# Patient Record
Sex: FEMALE | Race: WHITE | NOT HISPANIC OR LATINO | Employment: OTHER | URBAN - NONMETROPOLITAN AREA
[De-identification: names, ages, dates, MRNs, and addresses within clinical notes are randomized per-mention and may not be internally consistent; named-entity substitution may affect disease eponyms.]

---

## 2017-09-02 ENCOUNTER — HOSPITAL ENCOUNTER (EMERGENCY)
Facility: HOSPITAL | Age: 67
Discharge: HOME | End: 2017-09-02
Attending: FAMILY MEDICINE | Admitting: FAMILY MEDICINE

## 2017-09-02 ENCOUNTER — APPOINTMENT (OUTPATIENT)
Dept: CT IMAGING | Facility: HOSPITAL | Age: 67
End: 2017-09-02

## 2017-09-02 VITALS
BODY MASS INDEX: 41.65 KG/M2 | SYSTOLIC BLOOD PRESSURE: 148 MMHG | WEIGHT: 250 LBS | TEMPERATURE: 98.6 F | RESPIRATION RATE: 18 BRPM | HEART RATE: 52 BPM | DIASTOLIC BLOOD PRESSURE: 86 MMHG | HEIGHT: 65 IN | OXYGEN SATURATION: 98 %

## 2017-09-02 DIAGNOSIS — K44.9 HIATAL HERNIA WITH GERD: Primary | ICD-10-CM

## 2017-09-02 DIAGNOSIS — K21.9 HIATAL HERNIA WITH GERD: Primary | ICD-10-CM

## 2017-09-02 LAB
ALBUMIN SERPL-MCNC: 4.6 G/DL (ref 3.4–4.8)
ALBUMIN/GLOB SERPL: 1.8 G/DL (ref 1.5–2.5)
ALP SERPL-CCNC: 53 U/L (ref 35–104)
ALT SERPL W P-5'-P-CCNC: 54 U/L (ref 10–36)
ANION GAP SERPL CALCULATED.3IONS-SCNC: 6.4 MMOL/L (ref 3.6–11.2)
APTT PPP: 23.4 SECONDS (ref 23.8–36.1)
AST SERPL-CCNC: 35 U/L (ref 10–30)
BASOPHILS # BLD AUTO: 0.03 10*3/MM3 (ref 0–0.3)
BASOPHILS NFR BLD AUTO: 0.5 % (ref 0–2)
BILIRUB SERPL-MCNC: 0.5 MG/DL (ref 0.2–1.8)
BUN BLD-MCNC: 10 MG/DL (ref 7–21)
BUN/CREAT SERPL: 10.9 (ref 7–25)
CALCIUM SPEC-SCNC: 10.1 MG/DL (ref 7.7–10)
CHLORIDE SERPL-SCNC: 104 MMOL/L (ref 99–112)
CO2 SERPL-SCNC: 27.6 MMOL/L (ref 24.3–31.9)
CREAT BLD-MCNC: 0.92 MG/DL (ref 0.43–1.29)
DEPRECATED RDW RBC AUTO: 50.5 FL (ref 37–54)
EOSINOPHIL # BLD AUTO: 0.04 10*3/MM3 (ref 0–0.7)
EOSINOPHIL NFR BLD AUTO: 0.6 % (ref 0–5)
ERYTHROCYTE [DISTWIDTH] IN BLOOD BY AUTOMATED COUNT: 15 % (ref 11.5–14.5)
GFR SERPL CREATININE-BSD FRML MDRD: 61 ML/MIN/1.73
GLOBULIN UR ELPH-MCNC: 2.6 GM/DL
GLUCOSE BLD-MCNC: 182 MG/DL (ref 70–110)
HCT VFR BLD AUTO: 46.7 % (ref 37–47)
HGB BLD-MCNC: 15.3 G/DL (ref 12–16)
IMM GRANULOCYTES # BLD: 0 10*3/MM3 (ref 0–0.03)
IMM GRANULOCYTES NFR BLD: 0 % (ref 0–0.5)
INR PPP: 0.91 (ref 0.9–1.1)
LYMPHOCYTES # BLD AUTO: 0.9 10*3/MM3 (ref 1–3)
LYMPHOCYTES NFR BLD AUTO: 14.2 % (ref 21–51)
MCH RBC QN AUTO: 31.5 PG (ref 27–33)
MCHC RBC AUTO-ENTMCNC: 32.8 G/DL (ref 33–37)
MCV RBC AUTO: 96.1 FL (ref 80–94)
MONOCYTES # BLD AUTO: 0.22 10*3/MM3 (ref 0.1–0.9)
MONOCYTES NFR BLD AUTO: 3.5 % (ref 0–10)
NEUTROPHILS # BLD AUTO: 5.16 10*3/MM3 (ref 1.4–6.5)
NEUTROPHILS NFR BLD AUTO: 81.2 % (ref 30–70)
OSMOLALITY SERPL CALC.SUM OF ELEC: 279.4 MOSM/KG (ref 273–305)
PLATELET # BLD AUTO: 235 10*3/MM3 (ref 130–400)
PMV BLD AUTO: 9.6 FL (ref 6–10)
POTASSIUM BLD-SCNC: 4.2 MMOL/L (ref 3.5–5.3)
PROT SERPL-MCNC: 7.2 G/DL (ref 6–8)
PROTHROMBIN TIME: 12.3 SECONDS (ref 11–15.4)
RBC # BLD AUTO: 4.86 10*6/MM3 (ref 4.2–5.4)
SODIUM BLD-SCNC: 138 MMOL/L (ref 135–153)
WBC NRBC COR # BLD: 6.35 10*3/MM3 (ref 4.5–12.5)

## 2017-09-02 PROCEDURE — 74177 CT ABD & PELVIS W/CONTRAST: CPT | Performed by: RADIOLOGY

## 2017-09-02 PROCEDURE — 85730 THROMBOPLASTIN TIME PARTIAL: CPT | Performed by: FAMILY MEDICINE

## 2017-09-02 PROCEDURE — 74177 CT ABD & PELVIS W/CONTRAST: CPT

## 2017-09-02 PROCEDURE — 99283 EMERGENCY DEPT VISIT LOW MDM: CPT

## 2017-09-02 PROCEDURE — 96374 THER/PROPH/DIAG INJ IV PUSH: CPT

## 2017-09-02 PROCEDURE — 85610 PROTHROMBIN TIME: CPT | Performed by: FAMILY MEDICINE

## 2017-09-02 PROCEDURE — 0 IOPAMIDOL 61 % SOLUTION: Performed by: FAMILY MEDICINE

## 2017-09-02 PROCEDURE — 85025 COMPLETE CBC W/AUTO DIFF WBC: CPT | Performed by: FAMILY MEDICINE

## 2017-09-02 PROCEDURE — 80053 COMPREHEN METABOLIC PANEL: CPT | Performed by: FAMILY MEDICINE

## 2017-09-02 PROCEDURE — 96361 HYDRATE IV INFUSION ADD-ON: CPT

## 2017-09-02 RX ORDER — ESOMEPRAZOLE MAGNESIUM 40 MG/1
40 CAPSULE, DELAYED RELEASE ORAL
Qty: 30 CAPSULE | Refills: 0 | Status: SHIPPED | OUTPATIENT
Start: 2017-09-02 | End: 2022-04-13

## 2017-09-02 RX ORDER — ALUMINA, MAGNESIA, AND SIMETHICONE 2400; 2400; 240 MG/30ML; MG/30ML; MG/30ML
15 SUSPENSION ORAL ONCE
Status: COMPLETED | OUTPATIENT
Start: 2017-09-02 | End: 2017-09-02

## 2017-09-02 RX ORDER — ALBUTEROL SULFATE 90 UG/1
2 AEROSOL, METERED RESPIRATORY (INHALATION) EVERY 4 HOURS PRN
COMMUNITY
End: 2022-02-15 | Stop reason: SDUPTHER

## 2017-09-02 RX ORDER — FAMOTIDINE 10 MG/ML
20 INJECTION, SOLUTION INTRAVENOUS ONCE
Status: COMPLETED | OUTPATIENT
Start: 2017-09-02 | End: 2017-09-02

## 2017-09-02 RX ORDER — LANOLIN ALCOHOL/MO/W.PET/CERES
400 CREAM (GRAM) TOPICAL DAILY
Status: ON HOLD | COMMUNITY
End: 2022-07-20

## 2017-09-02 RX ORDER — METOPROLOL SUCCINATE 25 MG/1
25 TABLET, EXTENDED RELEASE ORAL DAILY
Status: ON HOLD | COMMUNITY
End: 2022-07-20

## 2017-09-02 RX ORDER — PREDNISONE 1 MG/1
10 TABLET ORAL
COMMUNITY
End: 2018-01-21

## 2017-09-02 RX ORDER — ASPIRIN 81 MG/1
81 TABLET, CHEWABLE ORAL DAILY
COMMUNITY

## 2017-09-02 RX ORDER — PREGABALIN 100 MG/1
300 CAPSULE ORAL 2 TIMES DAILY
COMMUNITY

## 2017-09-02 RX ORDER — ESCITALOPRAM OXALATE 20 MG/1
30 TABLET ORAL DAILY
COMMUNITY

## 2017-09-02 RX ORDER — SODIUM CHLORIDE 0.9 % (FLUSH) 0.9 %
10 SYRINGE (ML) INJECTION AS NEEDED
Status: DISCONTINUED | OUTPATIENT
Start: 2017-09-02 | End: 2017-09-03 | Stop reason: HOSPADM

## 2017-09-02 RX ORDER — ACETAMINOPHEN AND CODEINE PHOSPHATE 300; 30 MG/1; MG/1
1 TABLET ORAL 3 TIMES DAILY
COMMUNITY
End: 2019-03-27

## 2017-09-02 RX ORDER — SUCRALFATE 1 G/1
1 TABLET ORAL 4 TIMES DAILY
COMMUNITY
End: 2022-04-13

## 2017-09-02 RX ADMIN — LIDOCAINE HYDROCHLORIDE 15 ML: 20 SOLUTION ORAL; TOPICAL at 20:06

## 2017-09-02 RX ADMIN — IOPAMIDOL 90 ML: 612 INJECTION, SOLUTION INTRAVENOUS at 21:00

## 2017-09-02 RX ADMIN — FAMOTIDINE 20 MG: 10 INJECTION INTRAVENOUS at 20:06

## 2017-09-02 RX ADMIN — ALUMINUM HYDROXIDE, MAGNESIUM HYDROXIDE, AND DIMETHICONE 15 ML: 400; 400; 40 SUSPENSION ORAL at 20:05

## 2017-09-02 RX ADMIN — SODIUM CHLORIDE 500 ML: 9 INJECTION, SOLUTION INTRAVENOUS at 20:13

## 2017-09-03 NOTE — ED NOTES
Patient signed informed consent for IV contrast.     Aracelis Strong RN  09/02/17 2024       Aracelis Strong RN  09/02/17 2025

## 2017-09-03 NOTE — ED NOTES
Patient states that she has been having nausea and vomiting for about 5 days. Patient states that she knows she has a hernia but she was told before that surgery was not an option at that time. Patient states that she can only eat small amounts of food. If she attempts to eat normal amounts of food then she vomits.     Aracelis Strong RN  09/02/17 4899

## 2017-09-18 ENCOUNTER — APPOINTMENT (OUTPATIENT)
Dept: GENERAL RADIOLOGY | Facility: HOSPITAL | Age: 67
End: 2017-09-18

## 2017-09-18 ENCOUNTER — HOSPITAL ENCOUNTER (EMERGENCY)
Facility: HOSPITAL | Age: 67
Discharge: HOME | End: 2017-09-18
Attending: FAMILY MEDICINE | Admitting: FAMILY MEDICINE

## 2017-09-18 VITALS
HEIGHT: 66 IN | BODY MASS INDEX: 40.18 KG/M2 | SYSTOLIC BLOOD PRESSURE: 138 MMHG | HEART RATE: 63 BPM | DIASTOLIC BLOOD PRESSURE: 81 MMHG | TEMPERATURE: 97.7 F | WEIGHT: 250 LBS | OXYGEN SATURATION: 97 % | RESPIRATION RATE: 18 BRPM

## 2017-09-18 DIAGNOSIS — I10 ESSENTIAL HYPERTENSION: ICD-10-CM

## 2017-09-18 DIAGNOSIS — R07.9 CHEST PAIN IN ADULT: Primary | ICD-10-CM

## 2017-09-18 LAB
ALBUMIN SERPL-MCNC: 4.2 G/DL (ref 3.4–4.8)
ALBUMIN/GLOB SERPL: 1.8 G/DL (ref 1.5–2.5)
ALP SERPL-CCNC: 45 U/L (ref 35–104)
ALT SERPL W P-5'-P-CCNC: 42 U/L (ref 10–36)
ANION GAP SERPL CALCULATED.3IONS-SCNC: 6.5 MMOL/L (ref 3.6–11.2)
AST SERPL-CCNC: 32 U/L (ref 10–30)
BASOPHILS # BLD AUTO: 0.04 10*3/MM3 (ref 0–0.3)
BASOPHILS NFR BLD AUTO: 0.7 % (ref 0–2)
BILIRUB SERPL-MCNC: 0.5 MG/DL (ref 0.2–1.8)
BUN BLD-MCNC: 8 MG/DL (ref 7–21)
BUN/CREAT SERPL: 8.5 (ref 7–25)
CALCIUM SPEC-SCNC: 9.4 MG/DL (ref 7.7–10)
CHLORIDE SERPL-SCNC: 105 MMOL/L (ref 99–112)
CO2 SERPL-SCNC: 24.5 MMOL/L (ref 24.3–31.9)
CREAT BLD-MCNC: 0.94 MG/DL (ref 0.43–1.29)
DEPRECATED RDW RBC AUTO: 51.4 FL (ref 37–54)
EOSINOPHIL # BLD AUTO: 0.06 10*3/MM3 (ref 0–0.7)
EOSINOPHIL NFR BLD AUTO: 1 % (ref 0–7)
ERYTHROCYTE [DISTWIDTH] IN BLOOD BY AUTOMATED COUNT: 15.2 % (ref 11.5–14.5)
GFR SERPL CREATININE-BSD FRML MDRD: 60 ML/MIN/1.73
GLOBULIN UR ELPH-MCNC: 2.3 GM/DL
GLUCOSE BLD-MCNC: 162 MG/DL (ref 70–110)
HCT VFR BLD AUTO: 46.1 % (ref 37–47)
HGB BLD-MCNC: 15 G/DL (ref 12–16)
HOLD SPECIMEN: NORMAL
HOLD SPECIMEN: NORMAL
IMM GRANULOCYTES # BLD: 0.01 10*3/MM3 (ref 0–0.03)
IMM GRANULOCYTES NFR BLD: 0.2 % (ref 0–0.5)
LYMPHOCYTES # BLD AUTO: 0.75 10*3/MM3 (ref 1–3)
LYMPHOCYTES NFR BLD AUTO: 12.7 % (ref 16–46)
MCH RBC QN AUTO: 31.2 PG (ref 27–33)
MCHC RBC AUTO-ENTMCNC: 32.5 G/DL (ref 33–37)
MCV RBC AUTO: 95.8 FL (ref 80–94)
MONOCYTES # BLD AUTO: 0.28 10*3/MM3 (ref 0.1–0.9)
MONOCYTES NFR BLD AUTO: 4.7 % (ref 0–12)
NEUTROPHILS # BLD AUTO: 4.77 10*3/MM3 (ref 1.4–6.5)
NEUTROPHILS NFR BLD AUTO: 80.7 % (ref 40–75)
OSMOLALITY SERPL CALC.SUM OF ELEC: 273.8 MOSM/KG (ref 273–305)
PLATELET # BLD AUTO: 216 10*3/MM3 (ref 130–400)
PMV BLD AUTO: 9.6 FL (ref 6–10)
POTASSIUM BLD-SCNC: 4.2 MMOL/L (ref 3.5–5.3)
PROT SERPL-MCNC: 6.5 G/DL (ref 6–8)
RBC # BLD AUTO: 4.81 10*6/MM3 (ref 4.2–5.4)
SODIUM BLD-SCNC: 136 MMOL/L (ref 135–153)
TROPONIN I SERPL-MCNC: <0.006 NG/ML
TROPONIN I SERPL-MCNC: <0.006 NG/ML
WBC NRBC COR # BLD: 5.91 10*3/MM3 (ref 4.5–12.5)
WHOLE BLOOD HOLD SPECIMEN: NORMAL
WHOLE BLOOD HOLD SPECIMEN: NORMAL

## 2017-09-18 PROCEDURE — 93005 ELECTROCARDIOGRAM TRACING: CPT | Performed by: EMERGENCY MEDICINE

## 2017-09-18 PROCEDURE — 71020 HC CHEST PA AND LATERAL: CPT

## 2017-09-18 PROCEDURE — 84484 ASSAY OF TROPONIN QUANT: CPT | Performed by: EMERGENCY MEDICINE

## 2017-09-18 PROCEDURE — 93010 ELECTROCARDIOGRAM REPORT: CPT | Performed by: INTERNAL MEDICINE

## 2017-09-18 PROCEDURE — 85025 COMPLETE CBC W/AUTO DIFF WBC: CPT | Performed by: EMERGENCY MEDICINE

## 2017-09-18 PROCEDURE — 84484 ASSAY OF TROPONIN QUANT: CPT | Performed by: FAMILY MEDICINE

## 2017-09-18 PROCEDURE — 80053 COMPREHEN METABOLIC PANEL: CPT | Performed by: EMERGENCY MEDICINE

## 2017-09-18 PROCEDURE — 99283 EMERGENCY DEPT VISIT LOW MDM: CPT

## 2017-09-18 PROCEDURE — 71020 XR CHEST 2 VW: CPT | Performed by: RADIOLOGY

## 2017-09-18 RX ORDER — ASPIRIN 325 MG
325 TABLET ORAL ONCE
Status: COMPLETED | OUTPATIENT
Start: 2017-09-18 | End: 2017-09-18

## 2017-09-18 RX ADMIN — Medication 325 MG: at 18:25

## 2017-09-18 NOTE — ED NOTES
PT REMAINS IN LOBBY AT THIS TIME PT STATES THAT CHEST PAIN HAS SUBSIDED THAT SHE ONLY FEELS SOME TIGHTNESS IN CHEST AT THIS TIME NO DISTRESS NOTED WILL CONTINUE TO MONITOR     Lyubov Parra RN  09/18/17 1925

## 2017-09-19 ENCOUNTER — TRANSCRIBE ORDERS (OUTPATIENT)
Dept: ADMINISTRATIVE | Facility: HOSPITAL | Age: 67
End: 2017-09-19

## 2017-09-19 DIAGNOSIS — R07.9 CHEST PAIN, UNSPECIFIED: Primary | ICD-10-CM

## 2017-09-19 NOTE — ED NOTES
Patient given verbal instructions for follow-up with stress test. Patient verbalized understanding.     Aracelis Strong RN  09/19/17 4813

## 2017-09-19 NOTE — ED PROVIDER NOTES
"Subjective   History of Present Illness  66 y/o F here w/ substernal CP episode that occurred prior to presentation. Pt states that the pain was sharp and self-limited. Pt denies any SOA. No prior MI or previous stent. Pt states that she did undergo cath at [facility] approx 4 yrs ago after episode of CP but states that \"everything was clear.\" Pt states a h/o hiatal hernia and believes that reflux has caused her episode of pain.  Review of Systems   Constitutional: Negative for chills, fatigue and fever.   Respiratory: Negative for cough, chest tightness, shortness of breath and wheezing.    Cardiovascular: Positive for chest pain. Negative for palpitations and leg swelling.   Gastrointestinal: Negative for abdominal distention and abdominal pain.   Genitourinary: Negative for difficulty urinating and dysuria.   Musculoskeletal: Negative for back pain and neck pain.   Neurological: Negative for dizziness.   All other systems reviewed and are negative.      Past Medical History:   Diagnosis Date   • Arthritis    • Fibromyalgia    • Hernia    • Hypertension    • IBS (irritable bowel syndrome)    • Migraine        Allergies   Allergen Reactions   • Sulfa Antibiotics        Past Surgical History:   Procedure Laterality Date   •  SECTION     • CHOLECYSTECTOMY     • DILATATION AND CURETTAGE     • EYE SURGERY     • HYSTERECTOMY         No family history on file.    Social History     Social History   • Marital status:      Spouse name: N/A   • Number of children: N/A   • Years of education: N/A     Social History Main Topics   • Smoking status: Never Smoker   • Smokeless tobacco: Not on file   • Alcohol use Not on file   • Drug use: Not on file   • Sexual activity: Not on file     Other Topics Concern   • Not on file     Social History Narrative   • No narrative on file           Objective   Physical Exam   Constitutional: She is oriented to person, place, and time. She appears well-developed and " well-nourished. She is active.   HENT:   Head: Normocephalic and atraumatic.   Right Ear: Hearing and external ear normal.   Left Ear: Hearing and external ear normal.   Nose: Nose normal.   Mouth/Throat: Uvula is midline, oropharynx is clear and moist and mucous membranes are normal.   Eyes: Conjunctivae, EOM and lids are normal. Pupils are equal, round, and reactive to light.   Neck: Trachea normal, normal range of motion, full passive range of motion without pain and phonation normal. Neck supple.   Cardiovascular: Normal rate, regular rhythm and normal heart sounds.    Pulmonary/Chest: Effort normal and breath sounds normal.   Abdominal: Soft. Normal appearance.   Neurological: She is alert and oriented to person, place, and time. GCS eye subscore is 4. GCS verbal subscore is 5. GCS motor subscore is 6.   Skin: Skin is warm, dry and intact.   Psychiatric: She has a normal mood and affect. Her speech is normal and behavior is normal. Cognition and memory are normal.   Nursing note and vitals reviewed.      Procedures         ED Course  ED Course   Value Comment By Time   ECG 12 Lead NSR, 64 bpm. QTc 414ms. No acute ST segment abnormalities concerning for STEMI. No acute blocks or dysrhythmias. Hansel Carter MD 09/18 2101      No CP while in ED. Pt given outpt stress test. Tn and EKG negative for acute MI.      HEART Score  History: Slightly suspicious (+0)  ECG: Normal (+0)  Age: Greater than or equal to 65 (+2)  Risk Factors: 1 - 2 risk factors (+1)  Troponin: Normal limit or lower (+0)  Total: 3         MDM  Number of Diagnoses or Management Options     Amount and/or Complexity of Data Reviewed  Clinical lab tests: reviewed  Tests in the radiology section of CPT®: reviewed  Tests in the medicine section of CPT®: reviewed  Independent visualization of images, tracings, or specimens: yes        Final diagnoses:   Chest pain in adult   Essential hypertension            Hansel Carter  MD  09/18/17 4980

## 2017-12-04 ENCOUNTER — TRANSCRIBE ORDERS (OUTPATIENT)
Dept: ADMINISTRATIVE | Facility: HOSPITAL | Age: 67
End: 2017-12-04

## 2017-12-04 DIAGNOSIS — Z12.31 VISIT FOR SCREENING MAMMOGRAM: Primary | ICD-10-CM

## 2017-12-27 ENCOUNTER — HOSPITAL ENCOUNTER (OUTPATIENT)
Dept: MAMMOGRAPHY | Facility: HOSPITAL | Age: 67
Discharge: HOME | End: 2017-12-27

## 2018-01-04 ENCOUNTER — TRANSCRIBE ORDERS (OUTPATIENT)
Dept: ADMINISTRATIVE | Facility: HOSPITAL | Age: 68
End: 2018-01-04

## 2018-01-04 DIAGNOSIS — M35.00 SJOGREN'S SYNDROME, WITH UNSPECIFIED ORGAN INVOLVEMENT (HCC): Primary | ICD-10-CM

## 2018-01-12 ENCOUNTER — HOSPITAL ENCOUNTER (OUTPATIENT)
Dept: CT IMAGING | Facility: HOSPITAL | Age: 68
Discharge: HOME | End: 2018-01-12
Attending: OPHTHALMOLOGY | Admitting: OPHTHALMOLOGY

## 2018-01-12 DIAGNOSIS — M35.00 SJOGREN'S SYNDROME, WITH UNSPECIFIED ORGAN INVOLVEMENT (HCC): ICD-10-CM

## 2018-01-12 LAB — CREAT BLDA-MCNC: 0.9 MG/DL (ref 0.6–1.3)

## 2018-01-12 PROCEDURE — 70470 CT HEAD/BRAIN W/O & W/DYE: CPT | Performed by: RADIOLOGY

## 2018-01-12 PROCEDURE — 70470 CT HEAD/BRAIN W/O & W/DYE: CPT

## 2018-01-12 PROCEDURE — 82565 ASSAY OF CREATININE: CPT

## 2018-01-12 PROCEDURE — 0 IOPAMIDOL 61 % SOLUTION: Performed by: OPHTHALMOLOGY

## 2018-01-12 RX ADMIN — IOPAMIDOL 100 ML: 612 INJECTION, SOLUTION INTRAVENOUS at 11:15

## 2018-01-21 ENCOUNTER — APPOINTMENT (OUTPATIENT)
Dept: CT IMAGING | Facility: HOSPITAL | Age: 68
End: 2018-01-21

## 2018-01-21 ENCOUNTER — HOSPITAL ENCOUNTER (EMERGENCY)
Facility: HOSPITAL | Age: 68
Discharge: HOME | End: 2018-01-21
Attending: EMERGENCY MEDICINE | Admitting: EMERGENCY MEDICINE

## 2018-01-21 VITALS
DIASTOLIC BLOOD PRESSURE: 72 MMHG | WEIGHT: 250 LBS | HEIGHT: 65 IN | SYSTOLIC BLOOD PRESSURE: 110 MMHG | OXYGEN SATURATION: 98 % | TEMPERATURE: 98.2 F | HEART RATE: 77 BPM | BODY MASS INDEX: 41.65 KG/M2 | RESPIRATION RATE: 21 BRPM

## 2018-01-21 DIAGNOSIS — M35.00 SJOGREN'S SYNDROME, WITH UNSPECIFIED ORGAN INVOLVEMENT (HCC): ICD-10-CM

## 2018-01-21 DIAGNOSIS — M31.6 TEMPORAL ARTERITIS (HCC): ICD-10-CM

## 2018-01-21 DIAGNOSIS — R51.9 ACUTE NONINTRACTABLE HEADACHE, UNSPECIFIED HEADACHE TYPE: Primary | ICD-10-CM

## 2018-01-21 LAB
ALBUMIN SERPL-MCNC: 4 G/DL (ref 3.4–4.8)
ALBUMIN/GLOB SERPL: 1.7 G/DL (ref 1.5–2.5)
ALP SERPL-CCNC: 58 U/L (ref 35–104)
ALT SERPL W P-5'-P-CCNC: 55 U/L (ref 10–36)
ANION GAP SERPL CALCULATED.3IONS-SCNC: 6.1 MMOL/L (ref 3.6–11.2)
AST SERPL-CCNC: 25 U/L (ref 10–30)
BASOPHILS # BLD AUTO: 0.07 10*3/MM3 (ref 0–0.3)
BASOPHILS NFR BLD AUTO: 1.4 % (ref 0–2)
BILIRUB SERPL-MCNC: 0.4 MG/DL (ref 0.2–1.8)
BUN BLD-MCNC: 11 MG/DL (ref 7–21)
BUN/CREAT SERPL: 12.4 (ref 7–25)
CALCIUM SPEC-SCNC: 8.9 MG/DL (ref 7.7–10)
CHLORIDE SERPL-SCNC: 104 MMOL/L (ref 99–112)
CO2 SERPL-SCNC: 28.9 MMOL/L (ref 24.3–31.9)
CREAT BLD-MCNC: 0.89 MG/DL (ref 0.43–1.29)
DEPRECATED RDW RBC AUTO: 50.3 FL (ref 37–54)
EOSINOPHIL # BLD AUTO: 0.23 10*3/MM3 (ref 0–0.7)
EOSINOPHIL NFR BLD AUTO: 4.5 % (ref 0–7)
ERYTHROCYTE [DISTWIDTH] IN BLOOD BY AUTOMATED COUNT: 14 % (ref 11.5–14.5)
ERYTHROCYTE [SEDIMENTATION RATE] IN BLOOD: 2 MM/HR (ref 0–30)
GFR SERPL CREATININE-BSD FRML MDRD: 64 ML/MIN/1.73
GLOBULIN UR ELPH-MCNC: 2.3 GM/DL
GLUCOSE BLD-MCNC: 126 MG/DL (ref 70–110)
HCT VFR BLD AUTO: 44.8 % (ref 37–47)
HGB BLD-MCNC: 14.5 G/DL (ref 12–16)
IMM GRANULOCYTES # BLD: 0.01 10*3/MM3 (ref 0–0.03)
IMM GRANULOCYTES NFR BLD: 0.2 % (ref 0–0.5)
LYMPHOCYTES # BLD AUTO: 1.14 10*3/MM3 (ref 1–3)
LYMPHOCYTES NFR BLD AUTO: 22.1 % (ref 16–46)
MCH RBC QN AUTO: 32.2 PG (ref 27–33)
MCHC RBC AUTO-ENTMCNC: 32.4 G/DL (ref 33–37)
MCV RBC AUTO: 99.3 FL (ref 80–94)
MONOCYTES # BLD AUTO: 0.37 10*3/MM3 (ref 0.1–0.9)
MONOCYTES NFR BLD AUTO: 7.2 % (ref 0–12)
NEUTROPHILS # BLD AUTO: 3.34 10*3/MM3 (ref 1.4–6.5)
NEUTROPHILS NFR BLD AUTO: 64.6 % (ref 40–75)
OSMOLALITY SERPL CALC.SUM OF ELEC: 278.5 MOSM/KG (ref 273–305)
PLATELET # BLD AUTO: 199 10*3/MM3 (ref 130–400)
PMV BLD AUTO: 10.3 FL (ref 6–10)
POTASSIUM BLD-SCNC: 3.6 MMOL/L (ref 3.5–5.3)
PROT SERPL-MCNC: 6.3 G/DL (ref 6–8)
RBC # BLD AUTO: 4.51 10*6/MM3 (ref 4.2–5.4)
SODIUM BLD-SCNC: 139 MMOL/L (ref 135–153)
WBC NRBC COR # BLD: 5.16 10*3/MM3 (ref 4.5–12.5)

## 2018-01-21 PROCEDURE — 85652 RBC SED RATE AUTOMATED: CPT | Performed by: EMERGENCY MEDICINE

## 2018-01-21 PROCEDURE — 80053 COMPREHEN METABOLIC PANEL: CPT | Performed by: EMERGENCY MEDICINE

## 2018-01-21 PROCEDURE — 25010000002 KETOROLAC TROMETHAMINE PER 15 MG: Performed by: EMERGENCY MEDICINE

## 2018-01-21 PROCEDURE — 85025 COMPLETE CBC W/AUTO DIFF WBC: CPT | Performed by: EMERGENCY MEDICINE

## 2018-01-21 PROCEDURE — 70486 CT MAXILLOFACIAL W/O DYE: CPT

## 2018-01-21 PROCEDURE — 70450 CT HEAD/BRAIN W/O DYE: CPT | Performed by: RADIOLOGY

## 2018-01-21 PROCEDURE — 36415 COLL VENOUS BLD VENIPUNCTURE: CPT

## 2018-01-21 PROCEDURE — 70486 CT MAXILLOFACIAL W/O DYE: CPT | Performed by: RADIOLOGY

## 2018-01-21 PROCEDURE — 70450 CT HEAD/BRAIN W/O DYE: CPT

## 2018-01-21 PROCEDURE — 99283 EMERGENCY DEPT VISIT LOW MDM: CPT

## 2018-01-21 PROCEDURE — 96372 THER/PROPH/DIAG INJ SC/IM: CPT

## 2018-01-21 RX ORDER — PREDNISONE 1 MG/1
40 TABLET ORAL
Qty: 8 TABLET | Refills: 0 | Status: SHIPPED | OUTPATIENT
Start: 2018-01-21 | End: 2018-01-23

## 2018-01-21 RX ORDER — ATORVASTATIN CALCIUM 20 MG/1
20 TABLET, FILM COATED ORAL NIGHTLY
Status: ON HOLD | COMMUNITY
End: 2022-07-20

## 2018-01-21 RX ORDER — KETOROLAC TROMETHAMINE 30 MG/ML
60 INJECTION, SOLUTION INTRAMUSCULAR; INTRAVENOUS ONCE
Status: COMPLETED | OUTPATIENT
Start: 2018-01-21 | End: 2018-01-21

## 2018-01-21 RX ORDER — NYSTATIN 100000 [USP'U]/G
POWDER TOPICAL 2 TIMES DAILY PRN
COMMUNITY

## 2018-01-21 RX ADMIN — KETOROLAC TROMETHAMINE 60 MG: 60 INJECTION, SOLUTION INTRAMUSCULAR at 17:36

## 2018-01-21 NOTE — ED PROVIDER NOTES
Subjective   History of Present Illness  67-year-old white female complains of headache.  Patient states that since last night she's had a right temporal parietal headache.  She describes this as being very tender and feels that even her hair is sore when she touches it.  She has a history of migraine headaches but this feels like a different kind of headache.  There were no premonitory symptoms, exacerbating or alleviating factors.  Denies any arm or scotoma.  She's not had any confusion, slurred speech, focal weakness or other complaints.  She is very concerned because she is on multiple medicines which affect her immune system due to multiple rheumatologic diseases.  Review of Systems   All other systems reviewed and are negative.      Past Medical History:   Diagnosis Date   • Arthritis    • Fibromyalgia    • Hernia    • Hypertension    • IBS (irritable bowel syndrome)    • Migraine        Allergies   Allergen Reactions   • Sulfa Antibiotics        Past Surgical History:   Procedure Laterality Date   •  SECTION     • CHOLECYSTECTOMY     • DILATATION AND CURETTAGE     • EYE SURGERY     • HYSTERECTOMY         No family history on file.    Social History     Social History   • Marital status:      Spouse name: N/A   • Number of children: N/A   • Years of education: N/A     Social History Main Topics   • Smoking status: Never Smoker   • Smokeless tobacco: Not on file   • Alcohol use Not on file   • Drug use: Not on file   • Sexual activity: Not on file     Other Topics Concern   • Not on file     Social History Narrative           Objective   Physical Exam   Constitutional: She is oriented to person, place, and time. She appears well-developed and well-nourished.   HENT:   Head: Normocephalic and atraumatic.   Eyes: Conjunctivae and EOM are normal. Pupils are equal, round, and reactive to light.   Neck: Normal range of motion. Neck supple.   Cardiovascular: Normal rate, regular rhythm and normal  heart sounds.  Exam reveals no gallop and no friction rub.    No murmur heard.  Pulmonary/Chest: Effort normal and breath sounds normal. No respiratory distress. She has no wheezes. She has no rales.   Abdominal: Soft. Bowel sounds are normal. She exhibits no distension. There is no tenderness.   Musculoskeletal: Normal range of motion. She exhibits no edema.   Neurological: She is alert and oriented to person, place, and time. She has normal strength. No cranial nerve deficit or sensory deficit.   Skin: Skin is warm and dry.   No rashes noted on the scalp.   Psychiatric: She has a normal mood and affect.   Nursing note and vitals reviewed.      Procedures  Results for orders placed or performed during the hospital encounter of 01/21/18   Comprehensive Metabolic Panel   Result Value Ref Range    Glucose 126 (H) 70 - 110 mg/dL    BUN 11 7 - 21 mg/dL    Creatinine 0.89 0.43 - 1.29 mg/dL    Sodium 139 135 - 153 mmol/L    Potassium 3.6 3.5 - 5.3 mmol/L    Chloride 104 99 - 112 mmol/L    CO2 28.9 24.3 - 31.9 mmol/L    Calcium 8.9 7.7 - 10.0 mg/dL    Total Protein 6.3 6.0 - 8.0 g/dL    Albumin 4.00 3.40 - 4.80 g/dL    ALT (SGPT) 55 (H) 10 - 36 U/L    AST (SGOT) 25 10 - 30 U/L    Alkaline Phosphatase 58 35 - 104 U/L    Total Bilirubin 0.4 0.2 - 1.8 mg/dL    eGFR Non African Amer 64 >60 mL/min/1.73    Globulin 2.3 gm/dL    A/G Ratio 1.7 1.5 - 2.5 g/dL    BUN/Creatinine Ratio 12.4 7.0 - 25.0    Anion Gap 6.1 3.6 - 11.2 mmol/L   Sedimentation Rate   Result Value Ref Range    Sed Rate 2 0 - 30 mm/hr   CBC Auto Differential   Result Value Ref Range    WBC 5.16 4.50 - 12.50 10*3/mm3    RBC 4.51 4.20 - 5.40 10*6/mm3    Hemoglobin 14.5 12.0 - 16.0 g/dL    Hematocrit 44.8 37.0 - 47.0 %    MCV 99.3 (H) 80.0 - 94.0 fL    MCH 32.2 27.0 - 33.0 pg    MCHC 32.4 (L) 33.0 - 37.0 g/dL    RDW 14.0 11.5 - 14.5 %    RDW-SD 50.3 37.0 - 54.0 fl    MPV 10.3 (H) 6.0 - 10.0 fL    Platelets 199 130 - 400 10*3/mm3    Neutrophil % 64.6 40.0 - 75.0 %     Lymphocyte % 22.1 16.0 - 46.0 %    Monocyte % 7.2 0.0 - 12.0 %    Eosinophil % 4.5 0.0 - 7.0 %    Basophil % 1.4 0.0 - 2.0 %    Immature Grans % 0.2 0.0 - 0.5 %    Neutrophils, Absolute 3.34 1.40 - 6.50 10*3/mm3    Lymphocytes, Absolute 1.14 1.00 - 3.00 10*3/mm3    Monocytes, Absolute 0.37 0.10 - 0.90 10*3/mm3    Eosinophils, Absolute 0.23 0.00 - 0.70 10*3/mm3    Basophils, Absolute 0.07 0.00 - 0.30 10*3/mm3    Immature Grans, Absolute 0.01 0.00 - 0.03 10*3/mm3   Osmolality, Calculated   Result Value Ref Range    Osmolality Calc 278.5 273.0 - 305.0 mOsm/kg            ED Course  ED Course   Comment By Time   Headache is slightly improved.  Have discussed possible etiologies with the patient.  This may be related to her history of temporal arteritis versus early herpes zoster versus idiopathic headache versus?.  We'll try a short course of increased prednisone.  She will follow up with her primary doctor. Curt Solorio MD 01/21 1715                  MDM  Number of Diagnoses or Management Options  Acute nonintractable headache, unspecified headache type:   Systemic lupus erythematosus with other organ involvement, unspecified SLE type:   Temporal arteritis:      Amount and/or Complexity of Data Reviewed  Clinical lab tests: reviewed  Tests in the radiology section of CPT®: reviewed  Independent visualization of images, tracings, or specimens: yes    Risk of Complications, Morbidity, and/or Mortality  Presenting problems: high  Diagnostic procedures: high  Management options: high        Final diagnoses:   Acute nonintractable headache, unspecified headache type   Temporal arteritis   Systemic lupus erythematosus with other organ involvement, unspecified SLE type            Curt Solorio MD  01/21/18 1728       Curt Solorio MD  01/21/18 5235

## 2018-03-08 ENCOUNTER — OFFICE VISIT (OUTPATIENT)
Dept: SURGERY | Facility: CLINIC | Age: 68
End: 2018-03-08

## 2018-03-08 VITALS — HEIGHT: 65 IN | BODY MASS INDEX: 41.65 KG/M2 | WEIGHT: 250 LBS

## 2018-03-08 DIAGNOSIS — L98.9 SKIN LESION: Primary | ICD-10-CM

## 2018-03-08 DIAGNOSIS — E66.01 MORBID OBESITY DUE TO EXCESS CALORIES (HCC): ICD-10-CM

## 2018-03-08 PROCEDURE — 99203 OFFICE O/P NEW LOW 30 MIN: CPT | Performed by: SURGERY

## 2018-03-08 NOTE — PROGRESS NOTES
Subjective   Lorenza David is a 67 y.o. female.     History of Present Illness She has a small lesion on her right neck that came up over the last few months. It has grown very little. She has temporal arteritis. She is on steroids and aspirin. It does itch and bother her a little.     The following portions of the patient's history were reviewed and updated as appropriate: current medications, past family history, past medical history, past social history, past surgical history and problem list.    Review of Systems   Constitutional: Positive for fatigue. Negative for activity change, appetite change, chills, fever and unexpected weight change.   HENT: Negative for congestion, facial swelling and sore throat.    Eyes: Negative for photophobia and visual disturbance.   Respiratory: Positive for shortness of breath. Negative for chest tightness and wheezing.    Cardiovascular: Negative for chest pain, palpitations and leg swelling.   Gastrointestinal: Negative for abdominal distention, abdominal pain, anal bleeding, blood in stool, constipation, diarrhea, nausea, rectal pain and vomiting.   Endocrine: Negative for cold intolerance, heat intolerance, polydipsia and polyuria.   Genitourinary: Negative for difficulty urinating, dysuria, flank pain and urgency.   Musculoskeletal: Positive for arthralgias and myalgias. Negative for back pain.   Skin: Negative for rash and wound.   Allergic/Immunologic: Negative for immunocompromised state.   Neurological: Positive for weakness and headaches. Negative for dizziness, seizures, syncope, light-headedness and numbness.   Hematological: Negative for adenopathy. Does not bruise/bleed easily.   Psychiatric/Behavioral: Negative for behavioral problems and confusion. The patient is not nervous/anxious.     skin lesion    Objective   Physical Exam   Constitutional: She is oriented to person, place, and time. She appears well-developed and well-nourished. She does not appear ill. No  distress.       HENT:   Head: Normocephalic. Head is without laceration. Hair is normal.   Right Ear: Hearing and ear canal normal.   Left Ear: Hearing and ear canal normal.   Nose: Nose normal. No sinus tenderness. No epistaxis. Right sinus exhibits no maxillary sinus tenderness and no frontal sinus tenderness. Left sinus exhibits no maxillary sinus tenderness and no frontal sinus tenderness.   Eyes: Conjunctivae and lids are normal. Pupils are equal, round, and reactive to light.   Neck: Normal range of motion. No JVD present. No tracheal tenderness present. No tracheal deviation present. No thyroid mass and no thyromegaly present.   Cardiovascular: Normal rate and regular rhythm.  Exam reveals no gallop.    No murmur heard.  Pulmonary/Chest: Effort normal and breath sounds normal. No stridor. She has no wheezes. She exhibits no tenderness.   Abdominal: Soft. Bowel sounds are normal. She exhibits no distension, no ascites and no mass. There is no tenderness. There is no rebound and no guarding. No hernia.   Musculoskeletal: She exhibits no edema or deformity.   Lymphadenopathy:     She has no cervical adenopathy.     She has no axillary adenopathy.        Right: No inguinal and no supraclavicular adenopathy present.        Left: No inguinal and no supraclavicular adenopathy present.   Neurological: She is alert and oriented to person, place, and time. She exhibits normal muscle tone.   Skin: Skin is warm, dry and intact. No rash noted. No erythema. No pallor.   Psychiatric: She has a normal mood and affect. Her behavior is normal. Thought content normal.   Vitals reviewed.   6 mm keratosis right neck.    Assessment/Plan   Lorenza was seen today for skin lesion.    Diagnoses and all orders for this visit:    Skin lesion    Morbid obesity due to excess calories      Discussed excision but she wants to wait.       Body mass index is 41.6 kg/(m^2).  Discussed weight loss.

## 2019-01-15 ENCOUNTER — TRANSCRIBE ORDERS (OUTPATIENT)
Dept: ADMINISTRATIVE | Facility: HOSPITAL | Age: 69
End: 2019-01-15

## 2019-01-15 DIAGNOSIS — H53.40 VISUAL FIELD DEFECT: Primary | ICD-10-CM

## 2019-01-22 ENCOUNTER — APPOINTMENT (OUTPATIENT)
Dept: CARDIOLOGY | Facility: HOSPITAL | Age: 69
End: 2019-01-22
Attending: OPHTHALMOLOGY

## 2019-03-04 DIAGNOSIS — R06.02 SOB (SHORTNESS OF BREATH): Primary | ICD-10-CM

## 2019-03-26 ENCOUNTER — HOSPITAL ENCOUNTER (OUTPATIENT)
Dept: GENERAL RADIOLOGY | Facility: HOSPITAL | Age: 69
Discharge: HOME | End: 2019-03-26
Admitting: INTERNAL MEDICINE

## 2019-03-26 ENCOUNTER — TRANSCRIBE ORDERS (OUTPATIENT)
Dept: ADMINISTRATIVE | Facility: HOSPITAL | Age: 69
End: 2019-03-26

## 2019-03-26 DIAGNOSIS — R06.02 SHORTNESS OF BREATH: Primary | ICD-10-CM

## 2019-03-26 PROCEDURE — 71046 X-RAY EXAM CHEST 2 VIEWS: CPT

## 2019-03-26 PROCEDURE — 71046 X-RAY EXAM CHEST 2 VIEWS: CPT | Performed by: RADIOLOGY

## 2019-03-27 ENCOUNTER — OFFICE VISIT (OUTPATIENT)
Dept: PULMONOLOGY | Facility: CLINIC | Age: 69
End: 2019-03-27

## 2019-03-27 VITALS
HEIGHT: 65 IN | OXYGEN SATURATION: 95 % | BODY MASS INDEX: 41.32 KG/M2 | WEIGHT: 248 LBS | HEART RATE: 88 BPM | TEMPERATURE: 98.1 F | DIASTOLIC BLOOD PRESSURE: 87 MMHG | SYSTOLIC BLOOD PRESSURE: 155 MMHG

## 2019-03-27 DIAGNOSIS — R06.02 SOB (SHORTNESS OF BREATH) ON EXERTION: Primary | ICD-10-CM

## 2019-03-27 DIAGNOSIS — Z86.2 HISTORY OF AUTOIMMUNE DISORDER: ICD-10-CM

## 2019-03-27 DIAGNOSIS — G47.33 OSA ON CPAP: ICD-10-CM

## 2019-03-27 DIAGNOSIS — E66.01 MORBID OBESITY (HCC): ICD-10-CM

## 2019-03-27 DIAGNOSIS — Z99.89 OSA ON CPAP: ICD-10-CM

## 2019-03-27 DIAGNOSIS — R53.83 FATIGUE, UNSPECIFIED TYPE: ICD-10-CM

## 2019-03-27 PROCEDURE — 99205 OFFICE O/P NEW HI 60 MIN: CPT | Performed by: INTERNAL MEDICINE

## 2019-03-27 PROCEDURE — 94618 PULMONARY STRESS TESTING: CPT | Performed by: INTERNAL MEDICINE

## 2019-03-27 NOTE — PROGRESS NOTES
Interval history since last visit: None    Recent hospitalizations: None    Investigations (imaging, PFT's, labs, sleep study, record requests, etc.) None    Have you had the Influenza Vaccine? yes    Would you like to receive this Vaccine today? no    Have you had the Pneumonia Vaccine?  no  Would you like to receive this Vaccine today? no    Subjective    Lorenza David presents for the following Shortness of Breath      History of Present Illness   Ms. David is a 68-year-old female past medical history of multiple autoimmune disorder including temporal arteritis, fibromyalgia, polymyalgia rheumatica, Sjogren's syndrome, and keratoconjunctivitis sicca.  She is currently on glucocorticoid taper.  She follows with rheumatology in [place].  Plan is to start methotrexate.  Currently she is on Tocilizumab.  She presents to outpatient pulmonary clinic for the evaluation and management of her shortness of breath on exertion. She is a lifelong non-smoker.  She was apparently well 10 months ago.  She started feeling shortness of breath 4-5 months ago.  She complains of shortness of breath on exertion only.  She does not use supplemental oxygen.  She has a diagnosis of sleep apnea for which she uses CPAP of 8 cmH2O.  As per her, her shortness of breath has gradually worsened over last 4-5 months.  Her shortness of breath is not associated with chest pain, wheezing or cough.  She denies any fever or chills.  Shortness of breath increases with activity and decreases on rest.  She denies any discomfort while laying down flat.  She also denies any attacks of severe shortness of breath and coughing at nighttime.  She denies any night sweats or weight loss.  She denies any history of blood clot in the leg or in the lung.  She denies any complaints of runny nose or nasal stuffiness.  She is compliant with her CPAP machine overnight.  She denies any complains of morning headaches or excessive daytime sleepiness or unrefreshed sleep.   She does not complain of heartburn or stomach getting bloated.  She also told me that she had stress test and echo done in last 1 year at outside hospital in [place] and as per the patient it was normal.    Review of Systems   Constitutional: Positive for fatigue. Negative for activity change, appetite change, chills and unexpected weight change.   HENT: Negative for congestion, postnasal drip and rhinorrhea.    Eyes: Negative for discharge and itching.   Respiratory: Negative for apnea, cough, chest tightness, shortness of breath and wheezing.    Cardiovascular: Negative for chest pain, palpitations and leg swelling.   Gastrointestinal: Negative for abdominal distention and nausea.   Endocrine: Negative for cold intolerance and heat intolerance.   Genitourinary: Negative for difficulty urinating and dysuria.   Musculoskeletal: Negative for arthralgias and gait problem.   Skin: Negative for color change and pallor.   Allergic/Immunologic: Negative for environmental allergies and food allergies.   Neurological: Negative for dizziness and syncope.   Hematological: Negative for adenopathy. Does not bruise/bleed easily.   Psychiatric/Behavioral: Negative for confusion. The patient is not nervous/anxious.        Active Problems:  Problem List Items Addressed This Visit     None      Visit Diagnoses     SOB (shortness of breath) on exertion    -  Primary    Relevant Orders    BNP    Full Pulmonary Function Test With Bronchodilator    Walking Oximetry (Completed)    CBC & Differential    Comprehensive Metabolic Panel    CT chest hi resolution    Morbid obesity (CMS/HCC)        Fatigue, unspecified type        Relevant Orders    TSH    T3, Free    T4, Free    MILLICENT on CPAP        History of autoimmune disorder        Relevant Orders    CBC & Differential    Comprehensive Metabolic Panel    CT chest hi resolution          Past Medical History:  Past Medical History:   Diagnosis Date   • Anemia    • Arthritis    • Asthma    •  "Colon polyp    • Fibromyalgia    • Hernia    • Hypertension    • IBS (irritable bowel syndrome)    • Migraine        Family History:  No pertinent family history    Social History:  Social History     Tobacco Use   • Smoking status: Never Smoker   • Smokeless tobacco: Never Used   Substance Use Topics   • Alcohol use: No       Current Medications:  Current Outpatient Medications   Medication Sig Dispense Refill   • albuterol (PROVENTIL HFA;VENTOLIN HFA) 108 (90 Base) MCG/ACT inhaler Inhale 2 puffs Every 4 (Four) Hours As Needed for Wheezing.     • aspirin 81 MG chewable tablet Chew 81 mg Daily.     • atorvastatin (LIPITOR) 20 MG tablet Take 20 mg by mouth Every Night.     • escitalopram (LEXAPRO) 20 MG tablet Take 30 mg by mouth Daily.     • esomeprazole (nexIUM) 40 MG capsule Take 1 capsule by mouth Every Morning Before Breakfast. 30 capsule 0   • folic acid (FOLVITE) 400 MCG tablet Take 400 mcg by mouth Daily.     • methotrexate 2.5 MG tablet Take 20 mg by mouth 1 (One) Time Per Week.     • metoprolol succinate XL (TOPROL-XL) 25 MG 24 hr tablet Take 25 mg by mouth Daily.     • nystatin (nystatin) 547561 UNIT/GM powder Apply  topically 2 (Two) Times a Day As Needed.     • pregabalin (LYRICA) 100 MG capsule Take 300 mg by mouth 2 (Two) Times a Day.     • sucralfate (CARAFATE) 1 g tablet Take 1 g by mouth 4 (Four) Times a Day.     • Tocilizumab (ACTEMRA) 162 MG/0.9ML solution prefilled syringe Inject 162 mg under the skin 1 (One) Time Per Week.       No current facility-administered medications for this visit.        Allergies:  Allergies   Allergen Reactions   • Sulfa Antibiotics        Vitals:  /87   Pulse 88   Temp 98.1 °F (36.7 °C) (Oral)   Ht 165.1 cm (65\")   Wt 112 kg (248 lb)   SpO2 95%   BMI 41.27 kg/m²     Imaging:    Imaging Results (most recent)     None          Pulmonary Functions Testing Results:    No results found for: FEV1, FVC, ZKC8CPN, TLC, DLCO    Results for orders placed or performed " during the hospital encounter of 01/21/18   Comprehensive Metabolic Panel   Result Value Ref Range    Glucose 126 (H) 70 - 110 mg/dL    BUN 11 7 - 21 mg/dL    Creatinine 0.89 0.43 - 1.29 mg/dL    Sodium 139 135 - 153 mmol/L    Potassium 3.6 3.5 - 5.3 mmol/L    Chloride 104 99 - 112 mmol/L    CO2 28.9 24.3 - 31.9 mmol/L    Calcium 8.9 7.7 - 10.0 mg/dL    Total Protein 6.3 6.0 - 8.0 g/dL    Albumin 4.00 3.40 - 4.80 g/dL    ALT (SGPT) 55 (H) 10 - 36 U/L    AST (SGOT) 25 10 - 30 U/L    Alkaline Phosphatase 58 35 - 104 U/L    Total Bilirubin 0.4 0.2 - 1.8 mg/dL    eGFR Non African Amer 64 >60 mL/min/1.73    Globulin 2.3 gm/dL    A/G Ratio 1.7 1.5 - 2.5 g/dL    BUN/Creatinine Ratio 12.4 7.0 - 25.0    Anion Gap 6.1 3.6 - 11.2 mmol/L   Sedimentation Rate   Result Value Ref Range    Sed Rate 2 0 - 30 mm/hr   CBC Auto Differential   Result Value Ref Range    WBC 5.16 4.50 - 12.50 10*3/mm3    RBC 4.51 4.20 - 5.40 10*6/mm3    Hemoglobin 14.5 12.0 - 16.0 g/dL    Hematocrit 44.8 37.0 - 47.0 %    MCV 99.3 (H) 80.0 - 94.0 fL    MCH 32.2 27.0 - 33.0 pg    MCHC 32.4 (L) 33.0 - 37.0 g/dL    RDW 14.0 11.5 - 14.5 %    RDW-SD 50.3 37.0 - 54.0 fl    MPV 10.3 (H) 6.0 - 10.0 fL    Platelets 199 130 - 400 10*3/mm3    Neutrophil % 64.6 40.0 - 75.0 %    Lymphocyte % 22.1 16.0 - 46.0 %    Monocyte % 7.2 0.0 - 12.0 %    Eosinophil % 4.5 0.0 - 7.0 %    Basophil % 1.4 0.0 - 2.0 %    Immature Grans % 0.2 0.0 - 0.5 %    Neutrophils, Absolute 3.34 1.40 - 6.50 10*3/mm3    Lymphocytes, Absolute 1.14 1.00 - 3.00 10*3/mm3    Monocytes, Absolute 0.37 0.10 - 0.90 10*3/mm3    Eosinophils, Absolute 0.23 0.00 - 0.70 10*3/mm3    Basophils, Absolute 0.07 0.00 - 0.30 10*3/mm3    Immature Grans, Absolute 0.01 0.00 - 0.03 10*3/mm3   Osmolality, Calculated   Result Value Ref Range    Osmolality Calc 278.5 273.0 - 305.0 mOsm/kg       Objective   Physical Exam  Physical Exam:  Constitutional:  oriented to person, place, and time. appears well-developed and  well-nourished. No distress.   HENT:   Head: Normocephalic and atraumatic.   Right Ear: External ear normal.   Left Ear: External ear normal.   Nose: Nose normal.   Eyes: Pupils are equal, round, and reactive to light. Conjunctivae and EOM are normal. Right eye exhibits no discharge. Left eye exhibits no discharge. No scleral icterus.   Neck: Normal range of motion. Neck supple. No thyromegaly present.   Cardiovascular: Normal rate, regular rhythm, normal heart sounds and intact distal pulses.  Exam reveals no friction rub.    No murmur heard.  Pulmonary/Chest: No stridor.   Bilateral air entry equal.  Decreased breath sounds in left and right posterior lower lung zones.  No rhonchi heard.  Wheezing absent.  No crackles appreciated.    Abdominal: Soft. Bowel sounds are normal.exhibits no distension. There is no tenderness.   Musculoskeletal: Normal range of motion. exhibits no deformity.   +1 lower extremity edema bilateral.   Neurological: alert and oriented to person, place, and time. No cranial nerve deficit. Coordination normal.   Skin: Skin is warm and dry. Capillary refill takes less than 2 seconds. not diaphoretic. No erythema.   Psychiatric:   normal mood and affect.   behavior is normal.     Assessment/Plan   I have reviewed the past medical history, past surgical history, family history and social history of the patient.    I have reviewed the chest x-ray that was performed on 3/26/2019.  As per my interpretation, chest x-ray showed slight pulmonary congestion with interstitial thickening.        ICD-10-CM ICD-9-CM   1. SOB (shortness of breath) on exertion  -Ordered BNP  -Ordered pulmonary function test with bronchodilator.  -Patient underwent walk oximetry during clinic visit.  -Patient underwent stress test and echo at outside hospital in [place].  We will retrieve the records to rule out pulmonary hypertension in the setting of several autoimmune disorders.  -Ordered CT scan of the chest to rule out  interstitial lung disease in the setting of multiple autoimmune disorders.   R06.02 786.05   2. Morbid obesity (CMS/HCC)  - Patient's Body mass index is 41.27 kg/m². BMI is above normal parameters. Recommendations include: educational material and exercise counseling.   E66.01 278.01   3. Fatigue, unspecified type  -Ordered CBC  -Ordered CMP  -Ordered T3,T4 and TSH   R53.83 780.79   4. MILLICENT on CPAP  -Continue on CPAP 8 cmH2O at bedtime  The patient was extensively educated on the consequences of untreated obstructive sleep apnea namely cardiovascular/metabolic disorder, neurocognitive deficit, daytime sleepiness, motor vehicle accidents, depression, mood disorders and reduced quality of life.  At the end of conversation, the patient voices understanding of the disease process and treatment modality.  Patient also understands the risk of untreated obstructive sleep apnea and benefit benefits of the treatment.   G47.33 327.23    Z99.89 V46.8   5. History of autoimmune disorder  -We will rule out pulmonary hypertension and ILD as the cause of shortness of breath on exertion in the setting of multiple autoimmune disorders in the patient. Z86.2 V12.29           Follow-up in 6 months

## 2019-04-12 ENCOUNTER — HOSPITAL ENCOUNTER (OUTPATIENT)
Dept: CT IMAGING | Facility: HOSPITAL | Age: 69
Discharge: HOME | End: 2019-04-12

## 2019-04-12 ENCOUNTER — HOSPITAL ENCOUNTER (OUTPATIENT)
Dept: RESPIRATORY THERAPY | Facility: HOSPITAL | Age: 69
Discharge: HOME | End: 2019-04-12
Admitting: INTERNAL MEDICINE

## 2019-04-12 DIAGNOSIS — Z86.2 HISTORY OF AUTOIMMUNE DISORDER: ICD-10-CM

## 2019-04-12 DIAGNOSIS — R06.02 SOB (SHORTNESS OF BREATH) ON EXERTION: ICD-10-CM

## 2019-04-12 PROCEDURE — 71250 CT THORAX DX C-: CPT

## 2019-04-12 PROCEDURE — 71250 CT THORAX DX C-: CPT | Performed by: RADIOLOGY

## 2019-04-12 PROCEDURE — 94640 AIRWAY INHALATION TREATMENT: CPT

## 2019-04-12 PROCEDURE — 94727 GAS DIL/WSHOT DETER LNG VOL: CPT

## 2019-04-12 PROCEDURE — 94729 DIFFUSING CAPACITY: CPT | Performed by: INTERNAL MEDICINE

## 2019-04-12 PROCEDURE — 94060 EVALUATION OF WHEEZING: CPT | Performed by: INTERNAL MEDICINE

## 2019-04-12 PROCEDURE — 94727 GAS DIL/WSHOT DETER LNG VOL: CPT | Performed by: INTERNAL MEDICINE

## 2019-04-12 PROCEDURE — 94060 EVALUATION OF WHEEZING: CPT

## 2019-04-12 PROCEDURE — 94729 DIFFUSING CAPACITY: CPT

## 2019-04-12 RX ORDER — ALBUTEROL SULFATE 2.5 MG/3ML
2.5 SOLUTION RESPIRATORY (INHALATION) ONCE
Status: COMPLETED | OUTPATIENT
Start: 2019-04-12 | End: 2019-04-12

## 2019-04-12 RX ADMIN — ALBUTEROL SULFATE 2.5 MG: 2.5 SOLUTION RESPIRATORY (INHALATION) at 12:33

## 2019-04-24 ENCOUNTER — TRANSCRIBE ORDERS (OUTPATIENT)
Dept: ADMINISTRATIVE | Facility: HOSPITAL | Age: 69
End: 2019-04-24

## 2019-04-24 DIAGNOSIS — R42 SEVERE DIZZINESS: Primary | ICD-10-CM

## 2019-05-03 ENCOUNTER — HOSPITAL ENCOUNTER (OUTPATIENT)
Dept: MRI IMAGING | Facility: HOSPITAL | Age: 69
Discharge: HOME | End: 2019-05-03
Admitting: INTERNAL MEDICINE

## 2019-05-03 DIAGNOSIS — R42 SEVERE DIZZINESS: ICD-10-CM

## 2019-05-03 PROCEDURE — 0 GADOBENATE DIMEGLUMINE 529 MG/ML SOLUTION: Performed by: INTERNAL MEDICINE

## 2019-05-03 PROCEDURE — 70553 MRI BRAIN STEM W/O & W/DYE: CPT | Performed by: RADIOLOGY

## 2019-05-03 PROCEDURE — A9577 INJ MULTIHANCE: HCPCS | Performed by: INTERNAL MEDICINE

## 2019-05-03 PROCEDURE — 70553 MRI BRAIN STEM W/O & W/DYE: CPT

## 2019-05-03 RX ADMIN — GADOBENATE DIMEGLUMINE 20 ML: 529 INJECTION, SOLUTION INTRAVENOUS at 14:18

## 2019-07-30 ENCOUNTER — HOSPITAL ENCOUNTER (EMERGENCY)
Facility: HOSPITAL | Age: 69
Discharge: HOME | End: 2019-07-30
Attending: FAMILY MEDICINE | Admitting: FAMILY MEDICINE

## 2019-07-30 ENCOUNTER — APPOINTMENT (OUTPATIENT)
Dept: GENERAL RADIOLOGY | Facility: HOSPITAL | Age: 69
End: 2019-07-30

## 2019-07-30 VITALS
HEIGHT: 65 IN | SYSTOLIC BLOOD PRESSURE: 154 MMHG | HEART RATE: 67 BPM | RESPIRATION RATE: 16 BRPM | BODY MASS INDEX: 39.15 KG/M2 | WEIGHT: 235 LBS | OXYGEN SATURATION: 100 % | DIASTOLIC BLOOD PRESSURE: 96 MMHG | TEMPERATURE: 97.7 F

## 2019-07-30 DIAGNOSIS — G89.29 CHRONIC PAIN OF RIGHT KNEE: Primary | ICD-10-CM

## 2019-07-30 DIAGNOSIS — M25.561 CHRONIC PAIN OF RIGHT KNEE: Primary | ICD-10-CM

## 2019-07-30 PROCEDURE — 25010000002 METHYLPREDNISOLONE PER 80 MG: Performed by: NURSE PRACTITIONER

## 2019-07-30 PROCEDURE — 96372 THER/PROPH/DIAG INJ SC/IM: CPT

## 2019-07-30 PROCEDURE — 73564 X-RAY EXAM KNEE 4 OR MORE: CPT | Performed by: RADIOLOGY

## 2019-07-30 PROCEDURE — 99283 EMERGENCY DEPT VISIT LOW MDM: CPT

## 2019-07-30 PROCEDURE — 73562 X-RAY EXAM OF KNEE 3: CPT

## 2019-07-30 PROCEDURE — 25010000002 KETOROLAC TROMETHAMINE PER 15 MG: Performed by: NURSE PRACTITIONER

## 2019-07-30 RX ORDER — TRAMADOL HYDROCHLORIDE 50 MG/1
50 TABLET ORAL EVERY 4 HOURS PRN
Qty: 8 TABLET | Refills: 0 | Status: ON HOLD | OUTPATIENT
Start: 2019-07-30 | End: 2022-07-20

## 2019-07-30 RX ORDER — METHYLPREDNISOLONE ACETATE 80 MG/ML
120 INJECTION, SUSPENSION INTRA-ARTICULAR; INTRALESIONAL; INTRAMUSCULAR; SOFT TISSUE ONCE
Status: COMPLETED | OUTPATIENT
Start: 2019-07-30 | End: 2019-07-30

## 2019-07-30 RX ORDER — KETOROLAC TROMETHAMINE 30 MG/ML
30 INJECTION, SOLUTION INTRAMUSCULAR; INTRAVENOUS ONCE
Status: COMPLETED | OUTPATIENT
Start: 2019-07-30 | End: 2019-07-30

## 2019-07-30 RX ADMIN — METHYLPREDNISOLONE ACETATE 120 MG: 80 INJECTION, SUSPENSION INTRA-ARTICULAR; INTRALESIONAL; INTRAMUSCULAR; SOFT TISSUE at 16:02

## 2019-07-30 RX ADMIN — KETOROLAC TROMETHAMINE 30 MG: 30 INJECTION, SOLUTION INTRAMUSCULAR at 16:03

## 2019-08-03 NOTE — ED PROVIDER NOTES
Subjective     Knee Pain   Location:  Knee  Knee location:  R knee  Pain details:     Quality:  Aching and throbbing    Radiates to:  Does not radiate    Severity:  Moderate    Onset quality:  Sudden    Timing:  Constant  Chronicity:  Recurrent  Dislocation: no    Tetanus status:  Up to date  Prior injury to area:  No  Relieved by:  None tried  Worsened by:  Nothing  Associated symptoms: decreased ROM    Associated symptoms: no fever        Review of Systems   Constitutional: Negative.  Negative for fever.   HENT: Negative.    Respiratory: Negative.    Cardiovascular: Negative.  Negative for chest pain.   Gastrointestinal: Negative.  Negative for abdominal pain.   Endocrine: Negative.    Genitourinary: Negative.  Negative for dysuria.   Skin: Negative.    Neurological: Negative.    Psychiatric/Behavioral: Negative.    All other systems reviewed and are negative.      Past Medical History:   Diagnosis Date   • Anemia    • Arthritis    • Asthma    • Colon polyp    • Fibromyalgia    • Hernia    • Hypertension    • IBS (irritable bowel syndrome)    • Migraine        Allergies   Allergen Reactions   • Sulfa Antibiotics        Past Surgical History:   Procedure Laterality Date   •  SECTION     • CHOLECYSTECTOMY     • DILATATION AND CURETTAGE     • EYE SURGERY     • HYSTERECTOMY         No family history on file.    Social History     Socioeconomic History   • Marital status:      Spouse name: Not on file   • Number of children: Not on file   • Years of education: Not on file   • Highest education level: Not on file   Tobacco Use   • Smoking status: Never Smoker   • Smokeless tobacco: Never Used   Substance and Sexual Activity   • Alcohol use: No   • Drug use: No           Objective   Physical Exam   Constitutional: She is oriented to person, place, and time. She appears well-developed and well-nourished. No distress.   HENT:   Head: Normocephalic and atraumatic.   Right Ear: External ear normal.   Left  Ear: External ear normal.   Nose: Nose normal.   Eyes: Conjunctivae and EOM are normal. Pupils are equal, round, and reactive to light.   Neck: Normal range of motion. Neck supple. No JVD present. No tracheal deviation present.   Cardiovascular: Normal rate, regular rhythm and normal heart sounds.   No murmur heard.  Pulmonary/Chest: Effort normal and breath sounds normal. No respiratory distress. She has no wheezes.   Abdominal: Soft. Bowel sounds are normal. There is no tenderness.   Musculoskeletal: Normal range of motion. She exhibits no edema or deformity.        Right knee: She exhibits swelling. Tenderness found.   Neurological: She is alert and oriented to person, place, and time. No cranial nerve deficit.   Skin: Skin is warm and dry. No rash noted. She is not diaphoretic. No erythema. No pallor.   Psychiatric: She has a normal mood and affect. Her behavior is normal. Thought content normal.   Nursing note and vitals reviewed.      Procedures           ED Course                  MDM  Number of Diagnoses or Management Options  Chronic pain of right knee: established and worsening     Amount and/or Complexity of Data Reviewed  Tests in the radiology section of CPT®: reviewed          Final diagnoses:   Chronic pain of right knee            Rosalind Wilson, APRN  08/03/19 2551

## 2020-03-03 ENCOUNTER — HOSPITAL ENCOUNTER (EMERGENCY)
Facility: HOSPITAL | Age: 70
Discharge: HOME | End: 2020-03-03
Attending: EMERGENCY MEDICINE | Admitting: EMERGENCY MEDICINE

## 2020-03-03 ENCOUNTER — APPOINTMENT (OUTPATIENT)
Dept: GENERAL RADIOLOGY | Facility: HOSPITAL | Age: 70
End: 2020-03-03

## 2020-03-03 ENCOUNTER — APPOINTMENT (OUTPATIENT)
Dept: ULTRASOUND IMAGING | Facility: HOSPITAL | Age: 70
End: 2020-03-03

## 2020-03-03 VITALS
OXYGEN SATURATION: 97 % | WEIGHT: 240 LBS | HEART RATE: 62 BPM | TEMPERATURE: 98.3 F | RESPIRATION RATE: 18 BRPM | DIASTOLIC BLOOD PRESSURE: 80 MMHG | SYSTOLIC BLOOD PRESSURE: 133 MMHG | HEIGHT: 65 IN | BODY MASS INDEX: 39.99 KG/M2

## 2020-03-03 DIAGNOSIS — M25.562 ACUTE PAIN OF LEFT KNEE: Primary | ICD-10-CM

## 2020-03-03 PROCEDURE — 99283 EMERGENCY DEPT VISIT LOW MDM: CPT

## 2020-03-03 PROCEDURE — 73562 X-RAY EXAM OF KNEE 3: CPT | Performed by: RADIOLOGY

## 2020-03-03 PROCEDURE — 93971 EXTREMITY STUDY: CPT

## 2020-03-03 PROCEDURE — 93971 EXTREMITY STUDY: CPT | Performed by: RADIOLOGY

## 2020-03-03 PROCEDURE — 73562 X-RAY EXAM OF KNEE 3: CPT

## 2020-03-03 RX ORDER — HYDROCODONE BITARTRATE AND ACETAMINOPHEN 7.5; 325 MG/1; MG/1
1 TABLET ORAL EVERY 8 HOURS PRN
Qty: 9 TABLET | Refills: 0 | Status: SHIPPED | OUTPATIENT
Start: 2020-03-03

## 2020-03-03 NOTE — DISCHARGE INSTRUCTIONS
Rest, ice, elevate the left leg.  See Dr. Verde in the office in 2 days.  Medication as prescribed.  Return the emergency department right away if symptoms worsen/any problems.

## 2020-03-03 NOTE — ED PROVIDER NOTES
Subjective   Patient is a 69-year-old female who presents with a chief complaint of left knee pain that started Friday, 5 days ago.  She states that she was walking down a flight of stairs, states that she felt like she came down on her knee wrong, she started having pain that worsened over the next several hours and is persisted over the several days.  She did not fall.  She denies swelling or discoloration, any other symptoms, other injuries or complaints.          Review of Systems   Constitutional: Negative for chills, diaphoresis and fever.   HENT: Negative for ear pain, sore throat and trouble swallowing.    Eyes: Negative for photophobia and pain.   Respiratory: Negative for shortness of breath, wheezing and stridor.    Cardiovascular: Negative for chest pain and palpitations.   Gastrointestinal: Negative for abdominal distention, abdominal pain, blood in stool, diarrhea, nausea and vomiting.   Endocrine: Negative for polydipsia and polyphagia.   Genitourinary: Negative for difficulty urinating and flank pain.   Musculoskeletal: Negative for back pain, neck pain and neck stiffness.   Skin: Negative for color change and pallor.   Neurological: Negative for seizures, syncope and speech difficulty.   Psychiatric/Behavioral: Negative for confusion.   All other systems reviewed and are negative.      Past Medical History:   Diagnosis Date   • Anemia    • Arthritis    • Asthma    • Colon polyp    • Fibromyalgia    • Hernia    • Hypertension    • IBS (irritable bowel syndrome)    • Migraine        Allergies   Allergen Reactions   • Sulfa Antibiotics        Past Surgical History:   Procedure Laterality Date   •  SECTION     • CHOLECYSTECTOMY     • DILATATION AND CURETTAGE     • EYE SURGERY     • HYSTERECTOMY         No family history on file.    Social History     Socioeconomic History   • Marital status:      Spouse name: Not on file   • Number of children: Not on file   • Years of education: Not on  file   • Highest education level: Not on file   Tobacco Use   • Smoking status: Never Smoker   • Smokeless tobacco: Never Used   Substance and Sexual Activity   • Alcohol use: No   • Drug use: No           Objective   Physical Exam   Constitutional: She is oriented to person, place, and time. She appears well-developed and well-nourished. No distress.   HENT:   Head: Normocephalic and atraumatic.   Eyes: Pupils are equal, round, and reactive to light. EOM are normal. No scleral icterus.   Neck: Normal range of motion. Neck supple. No neck rigidity. No tracheal deviation present.   Cardiovascular: Normal rate, regular rhythm and intact distal pulses.   Pulmonary/Chest: Effort normal and breath sounds normal. No respiratory distress. She exhibits no tenderness.   Abdominal: Soft. Bowel sounds are normal. There is no tenderness. There is no rebound and no guarding.   Musculoskeletal: Normal range of motion.   The left knee is not swollen, not discolored, not overly warm.  There is mild tenderness posteriorly and in the upper gastrocnemius musculature.  There is no Homans sign.  Distal neurovascular is intact.   Neurological: She is alert and oriented to person, place, and time. She has normal strength. No sensory deficit. She exhibits normal muscle tone. Coordination normal. GCS eye subscore is 4. GCS verbal subscore is 5. GCS motor subscore is 6.   Skin: Skin is warm and dry. Capillary refill takes less than 2 seconds. No cyanosis. No pallor.   Psychiatric: She has a normal mood and affect. Her behavior is normal.   Nursing note and vitals reviewed.      Procedures  US Venous Doppler Lower Extremity Left (duplex)   Final Result   No DVT.       This report was finalized on 3/3/2020 3:55 PM by Dr. Javier Paulino MD.          XR Knee 3 View Left   Final Result   No acute osseous or articular abnormalities.       This report was finalized on 3/3/2020 3:17 PM by Dr. Javier Paulino MD.                     ED Course  ED  Course as of Mar 03 1658   Tue Mar 03, 2020   1621 Patient's emergency department stay has been uneventful.  Never has she shown any signs of distress.  [facility]ett report is obtained, #[id number].  She receives 90 Norco 7.5's, which is a 30-day supply, regularly from her PCP Dr. Verde in [place].  She last filled this on February 3.    [CM]      ED Course User Index  [CM] Darell Gregg MD                                           [facility]    Final diagnoses:   Acute pain of left knee             Please note that portions of this note were completed with a voice recognition program.        Darell Gregg MD  03/03/20 1700

## 2021-02-23 ENCOUNTER — IMMUNIZATION (OUTPATIENT)
Dept: VACCINE CLINIC | Facility: HOSPITAL | Age: 71
End: 2021-02-23

## 2021-02-23 PROCEDURE — 91300 HC SARSCOV02 VAC 30MCG/0.3ML IM: CPT | Performed by: INTERNAL MEDICINE

## 2021-02-23 PROCEDURE — 0001A: CPT | Performed by: INTERNAL MEDICINE

## 2021-03-09 NOTE — ED PROVIDER NOTES
Subjective   Patient is a 66 y.o. female presenting with vomiting.   History provided by:  Patient  Vomiting   The primary symptoms include nausea and vomiting. Primary symptoms do not include fatigue, abdominal pain, diarrhea, dysuria, myalgias, arthralgias or rash. The illness began 3 to 5 days ago. The onset was gradual. The problem has not changed since onset.  The illness does not include chills. Significant associated medical issues include GERD.       Review of Systems   Constitutional: Negative for activity change, appetite change, chills and fatigue.   HENT: Negative for congestion.    Eyes: Negative for pain.   Respiratory: Negative for cough, shortness of breath, wheezing and stridor.    Cardiovascular: Negative for chest pain.   Gastrointestinal: Positive for nausea and vomiting. Negative for abdominal pain and diarrhea.   Genitourinary: Negative for dysuria.   Musculoskeletal: Negative for arthralgias, myalgias, neck pain and neck stiffness.   Skin: Negative for rash.   Neurological: Negative for dizziness, syncope, speech difficulty, weakness and headaches.   Psychiatric/Behavioral: Negative for agitation.       Past Medical History:   Diagnosis Date   • Arthritis    • Fibromyalgia    • Hernia    • Hypertension    • IBS (irritable bowel syndrome)    • Migraine        Allergies   Allergen Reactions   • Sulfa Antibiotics        Past Surgical History:   Procedure Laterality Date   •  SECTION     • CHOLECYSTECTOMY     • DILATATION AND CURETTAGE     • EYE SURGERY     • HYSTERECTOMY         History reviewed. No pertinent family history.    Social History     Social History   • Marital status:      Spouse name: N/A   • Number of children: N/A   • Years of education: N/A     Social History Main Topics   • Smoking status: Never Smoker   • Smokeless tobacco: None   • Alcohol use None   • Drug use: None   • Sexual activity: Not Asked     Other Topics Concern   • None     Social History Narrative  "  • None           Objective   Physical Exam   Constitutional: She is oriented to person, place, and time. She appears well-developed and well-nourished.   HENT:   Head: Normocephalic and atraumatic.   Right Ear: External ear normal.   Left Ear: External ear normal.   Nose: Nose normal.   Mouth/Throat: Oropharynx is clear and moist.   Eyes: EOM are normal. Pupils are equal, round, and reactive to light.   Neck: Neck supple. No tracheal deviation present. No thyromegaly present.   Pulmonary/Chest: Effort normal and breath sounds normal.   Abdominal: Soft. Bowel sounds are normal. She exhibits distension. There is no tenderness.   Musculoskeletal: Normal range of motion. She exhibits no edema or deformity.   Neurological: She is alert and oriented to person, place, and time.   Skin: Skin is warm.   Psychiatric: She has a normal mood and affect. Her behavior is normal. Judgment and thought content normal.   Nursing note and vitals reviewed.      Procedures         ED Course  ED Course   Comment By Time   Pt reports that she has had a hiatial hernia and GERD but they recently took her off the \"purple pill\" and since that time her reflux has worsened and she is here tonight to make sure there isnt worsening of her hernia or something else cause her to vomit more Corinne Lee, DO 09/02 2316                  MDM  Number of Diagnoses or Management Options  Hiatal hernia with GERD: new and does not require workup     Amount and/or Complexity of Data Reviewed  Clinical lab tests: ordered and reviewed  Tests in the radiology section of CPT®: reviewed and ordered  Tests in the medicine section of CPT®: reviewed and ordered  Independent visualization of images, tracings, or specimens: yes    Risk of Complications, Morbidity, and/or Mortality  Presenting problems: moderate  Diagnostic procedures: moderate  Management options: moderate    Patient Progress  Patient progress: stable      Final diagnoses:   Hiatal hernia " with AMRITA Lee, DO  09/02/17 1778     no

## 2021-03-16 ENCOUNTER — IMMUNIZATION (OUTPATIENT)
Dept: VACCINE CLINIC | Facility: HOSPITAL | Age: 71
End: 2021-03-16

## 2021-03-16 PROCEDURE — 91300 HC SARSCOV02 VAC 30MCG/0.3ML IM: CPT | Performed by: INTERNAL MEDICINE

## 2021-03-16 PROCEDURE — 0002A: CPT | Performed by: INTERNAL MEDICINE

## 2022-02-15 ENCOUNTER — OFFICE VISIT (OUTPATIENT)
Dept: PULMONOLOGY | Facility: CLINIC | Age: 72
End: 2022-02-15

## 2022-02-15 ENCOUNTER — LAB (OUTPATIENT)
Dept: LAB | Facility: HOSPITAL | Age: 72
End: 2022-02-15

## 2022-02-15 VITALS
HEART RATE: 97 BPM | DIASTOLIC BLOOD PRESSURE: 82 MMHG | HEIGHT: 66 IN | WEIGHT: 250 LBS | OXYGEN SATURATION: 97 % | SYSTOLIC BLOOD PRESSURE: 168 MMHG | TEMPERATURE: 97.1 F | BODY MASS INDEX: 40.18 KG/M2

## 2022-02-15 DIAGNOSIS — M62.81 MUSCULAR WEAKNESS: ICD-10-CM

## 2022-02-15 DIAGNOSIS — R06.02 SHORTNESS OF BREATH: ICD-10-CM

## 2022-02-15 DIAGNOSIS — M62.81 MUSCULAR WEAKNESS: Primary | ICD-10-CM

## 2022-02-15 DIAGNOSIS — G47.33 OBSTRUCTIVE SLEEP APNEA: ICD-10-CM

## 2022-02-15 PROBLEM — M35.00 SJOGREN'S DISEASE (HCC): Status: ACTIVE | Noted: 2022-02-15

## 2022-02-15 PROBLEM — M31.6 TEMPORAL ARTERITIS: Status: ACTIVE | Noted: 2022-02-15

## 2022-02-15 PROBLEM — M35.3 POLYMYALGIA RHEUMATICA: Status: ACTIVE | Noted: 2022-02-15

## 2022-02-15 PROCEDURE — 36415 COLL VENOUS BLD VENIPUNCTURE: CPT

## 2022-02-15 PROCEDURE — 83519 RIA NONANTIBODY: CPT

## 2022-02-15 PROCEDURE — 86255 FLUORESCENT ANTIBODY SCREEN: CPT

## 2022-02-15 PROCEDURE — 99214 OFFICE O/P EST MOD 30 MIN: CPT | Performed by: PHYSICIAN ASSISTANT

## 2022-02-15 PROCEDURE — 94618 PULMONARY STRESS TESTING: CPT | Performed by: PHYSICIAN ASSISTANT

## 2022-02-15 PROCEDURE — 80053 COMPREHEN METABOLIC PANEL: CPT

## 2022-02-15 PROCEDURE — 85025 COMPLETE CBC W/AUTO DIFF WBC: CPT

## 2022-02-15 PROCEDURE — 83880 ASSAY OF NATRIURETIC PEPTIDE: CPT

## 2022-02-15 RX ORDER — ERGOCALCIFEROL 1.25 MG/1
50000 CAPSULE ORAL WEEKLY
COMMUNITY
Start: 2022-01-31

## 2022-02-15 RX ORDER — FLUTICASONE PROPIONATE 50 MCG
2 SPRAY, SUSPENSION (ML) NASAL DAILY
Qty: 16 G | Refills: 6 | Status: SHIPPED | OUTPATIENT
Start: 2022-02-15

## 2022-02-15 RX ORDER — OMEPRAZOLE 40 MG/1
40 CAPSULE, DELAYED RELEASE ORAL DAILY
COMMUNITY
End: 2022-04-13

## 2022-02-15 RX ORDER — DULOXETIN HYDROCHLORIDE 20 MG/1
20 CAPSULE, DELAYED RELEASE ORAL DAILY
COMMUNITY

## 2022-02-15 RX ORDER — NITROFURANTOIN 25; 75 MG/1; MG/1
CAPSULE ORAL
Status: ON HOLD | COMMUNITY
Start: 2021-12-31 | End: 2022-07-20

## 2022-02-15 RX ORDER — MIRTAZAPINE 15 MG/1
15 TABLET, FILM COATED ORAL
Status: ON HOLD | COMMUNITY
Start: 2021-12-07 | End: 2022-07-20

## 2022-02-15 RX ORDER — FAMOTIDINE 20 MG/1
20 TABLET, FILM COATED ORAL 2 TIMES DAILY
COMMUNITY
Start: 2021-12-01 | End: 2022-04-13

## 2022-02-15 RX ORDER — ALBUTEROL SULFATE 90 UG/1
2 AEROSOL, METERED RESPIRATORY (INHALATION) EVERY 4 HOURS PRN
Qty: 18 G | Refills: 8 | Status: ON HOLD | OUTPATIENT
Start: 2022-02-15 | End: 2022-07-20

## 2022-02-15 NOTE — PROGRESS NOTES
"Chief Complaint  Shortness of Breath    Subjective          Lorenza David presents to [facility] PULMONARY & CRITICAL CARE MEDICINE  History of Present Illness    Patient presents today for follow-up of shortness of breath  She has a significant history which includes temporal arteritis, fibromyalgia, polymyalgia rheumatica, Sjogren's syndrome, and keratoconjunctivitis sicca.  She has presented again for reevaluation as she is again noting increasing shortness of breath on exertion.  She continues on tocilizumab.  Follows with rheumatology in [place].  She has no previous smoking history. She has been notable for oral prednisone use for the past 6 years for TA (max of 40 mg, have been trying to titrate down over time, but notes increased weakness following decreased dosage). Currently on 9 mg daily.   She states symptoms were more mild a few years ago, but have progressively worsened over the last few months.  She also noticed progressive muscle weakness/fatigue, that is becoming more constant in nature.  Shortness of breath is noted on exertion.  She is notable for previously diagnosed sleep apnea and has not had any significant issues with the device over time.  Also notable for recurrent runny nose with postnasal drip.      Objective   Vital Signs:   /82   Pulse 97   Temp 97.1 °F (36.2 °C) (Temporal)   Ht 166.4 cm (65.5\")   Wt 113 kg (250 lb)   SpO2 97%   BMI 40.97 kg/m²     Physical Exam  Vitals reviewed.   Constitutional:       General: She is not in acute distress.     Appearance: She is well-developed. She is not diaphoretic.   HENT:      Head: Normocephalic and atraumatic.   Neck:      Thyroid: No thyromegaly.   Cardiovascular:      Rate and Rhythm: Normal rate and regular rhythm.      Heart sounds: Normal heart sounds, S1 normal and S2 normal.   Pulmonary:      Effort: Pulmonary effort is normal.      Breath sounds: No wheezing, rhonchi or rales.   Neurological:      Mental " Status: She is alert and oriented to person, place, and time.   Psychiatric:         Behavior: Behavior normal.        Result Review :   The following data was reviewed by: Urmila Harrison PA-C on 02/15/2022:    Reviewed previous CT high-resolution chest from 2019.    No significant interstitial lung disease (minimal right subpleural scarring noted).    · 2 mm subpleural nodule to right lung on image 32   · 5 mm subpleural nodule of the right lung on image 45   · 4 mm subpleural nodule the right lung on image 46   · 8 mm right basilar nodule of the right lung on image 62  · 3 2 mm nodules of the left lung base on images  57, 60    Moderate-sized hiatal hernia.      Reviewed PFT from 2019        Assessment and Plan    Diagnoses and all orders for this visit:    1. Muscular weakness (Primary)  -     Acetylcholine Receptor Antibody Panel; Future  -     CBC & Differential; Future  -     Comprehensive Metabolic Panel; Future  -     BNP; Future  -     CT Chest Without Contrast; Future  -     CT soft tissue neck wo contrast; Future  -     Adult Transthoracic Echo Complete W/ Cont if Necessary Per Protocol; Future    2. Shortness of breath  -     CBC & Differential; Future  -     Comprehensive Metabolic Panel; Future  -     BNP; Future  -     CT Chest Without Contrast; Future  -     CT soft tissue neck wo contrast; Future  -     Adult Transthoracic Echo Complete W/ Cont if Necessary Per Protocol; Future    3. Obstructive sleep apnea    Other orders  -     fluticasone (FLONASE) 50 MCG/ACT nasal spray; 2 sprays into the nostril(s) as directed by provider Daily. Administer 2 sprays in each nostril for each dose.  Dispense: 16 g; Refill: 6  -     albuterol sulfate  (90 Base) MCG/ACT inhaler; Inhale 2 puffs Every 4 (Four) Hours As Needed for Wheezing or Shortness of Air.  Dispense: 18 g; Refill: 8      Shortness of breath on exertion, muscular weakness:  Patient history significant for polymyalgia rheumatica, Sjogren's  disease, temporal arteritis, fibromyalgia, and keratoconjunctivitis sicca.   This may have some involvement of current symptoms.   Per symptom description, also concerned of myasthenia gravis.  · Ordered acetylcholine receptor antibody panel  · Ordered CT chest without contrast  · Ordered CT neck for thymus assessment  · Preferred to await PFT at this time due to progressive weakness, difficultly with the test. Will consider after CT completed.   · Ordered albuterol inhaler for as needed use  · Ordered echocardiogram ordered CBC with differential  · Ordered CMP  · Ordered BNP  · Oximetry test completed in office.  No significant desaturation.  · Patient has been on chronic steroid usage over the past several years for temporal arteritis.  More recently has been started on other medications as well.    Specialty working to progressively titrate down the prednisone.    If no significant findings, will assist as needed with referral to neurology.       Allergic rhinitis:  · Ordered Flonase for as needed use  · Can use as needed antihistamine tablet.      Pulmonary nodules:  · Findings listed in results review.  Ordered repeat CT chest      Obstructive sleep apnea  · Compliant with AutoPap device    Management obstructive sleep apnea also includes lifestyle modifications including weight loss, avoidance of alcohol, sedated, caffeine especially before bedtime, allowing adequate sleep time, and body position during sleep such as side versus back. 10% weight loss can result in a 50% improvement of the apnea-hypopnea index.  Untreated sleep apnea can lead to cardiovascular/metabolic disorder, neurocognitive deficit, daytime sleepiness, motor vehicle accidents, depression, mood disorders and reduced quality of life.  Patient understands the risk of untreated obstructive sleep apnea and benefit benefits of the treatment. Recommended at least 4 hours of use per night for 70% of every month (approximately 21 out of 30 days) per  "CMS guidelines.         Follow Up   Return in about 6 months (around 8/15/2022), or as needed, for Next scheduled follow up.  Patient was given instructions and counseling regarding her condition or for health maintenance advice. Please see specific information pulled into the AVS if appropriate.       Addendum:  Updated the patient on 3/18/2022 with imaging and lab results.  · CMP overall normal (notable for slight decreased EGFR 56, ALT elevated but stable at 53).   · CBC with differential only notable for elevated neutrophil/lymphocyte/monocyte percentage with normal absolute values.  No anemia, thrombocytopenia, or leukocytosis/leukopenia.  · Autoimmune testing including acetylcholine receptor binding antibody, blocking antibody results negative.  Again, discussed that she may need further testing if this is still of a high concern.  · Reviewed neck CT   notable for \"1.6 cm right lobe of thyroid cyst\"  patient followed with endocrinology (right before CT completed, stated that US thyroid was completed in office that day). Was told nodule(s) were not of a significant size.   · CT chest discussed.  Notable for stable nodules (4 of the right lung measuring a millimeter or less, and 3 tiny nodules the left lower lobe) over the past 2 years.   We will consider repeat CT scan in 1 to 2 years.  · Discussed echocardiogram.  Follows with cardiology, no urgent findings..     After discussion, she has again noted episode of increased weakness and exertional fatigue after recently decreasing oral steroids to 8 mg.  She has been notable for long-term use in the setting of temporal arteritis prior to starting other additional medications.  Discussed that she may benefit from endocrinology follow-up for further assessment and recommendations for more appropriate steroid replacement to reduce other systemic side effects, if possible.    We will forward imaging and results to the appropriate providers.      "

## 2022-02-16 LAB
ALBUMIN SERPL-MCNC: 4.6 G/DL (ref 3.5–5.2)
ALBUMIN/GLOB SERPL: 1.9 G/DL
ALP SERPL-CCNC: 56 U/L (ref 39–117)
ALT SERPL W P-5'-P-CCNC: 53 U/L (ref 1–33)
ANION GAP SERPL CALCULATED.3IONS-SCNC: 13 MMOL/L (ref 5–15)
AST SERPL-CCNC: 30 U/L (ref 1–32)
BASOPHILS # BLD AUTO: 0.05 10*3/MM3 (ref 0–0.2)
BASOPHILS NFR BLD AUTO: 0.7 % (ref 0–1.5)
BILIRUB SERPL-MCNC: 0.4 MG/DL (ref 0–1.2)
BUN SERPL-MCNC: 9 MG/DL (ref 8–23)
BUN/CREAT SERPL: 9.1 (ref 7–25)
CALCIUM SPEC-SCNC: 9.6 MG/DL (ref 8.6–10.5)
CHLORIDE SERPL-SCNC: 101 MMOL/L (ref 98–107)
CO2 SERPL-SCNC: 22 MMOL/L (ref 22–29)
CREAT SERPL-MCNC: 0.99 MG/DL (ref 0.57–1)
DEPRECATED RDW RBC AUTO: 43 FL (ref 37–54)
EOSINOPHIL # BLD AUTO: 0.04 10*3/MM3 (ref 0–0.4)
EOSINOPHIL NFR BLD AUTO: 0.5 % (ref 0.3–6.2)
ERYTHROCYTE [DISTWIDTH] IN BLOOD BY AUTOMATED COUNT: 13.1 % (ref 12.3–15.4)
GFR SERPL CREATININE-BSD FRML MDRD: 56 ML/MIN/1.73
GLOBULIN UR ELPH-MCNC: 2.4 GM/DL
GLUCOSE SERPL-MCNC: 129 MG/DL (ref 65–99)
HCT VFR BLD AUTO: 42.8 % (ref 34–46.6)
HGB BLD-MCNC: 14.1 G/DL (ref 12–15.9)
IMM GRANULOCYTES # BLD AUTO: 0.03 10*3/MM3 (ref 0–0.05)
IMM GRANULOCYTES NFR BLD AUTO: 0.4 % (ref 0–0.5)
LYMPHOCYTES # BLD AUTO: 0.86 10*3/MM3 (ref 0.7–3.1)
LYMPHOCYTES NFR BLD AUTO: 11.2 % (ref 19.6–45.3)
MCH RBC QN AUTO: 29.6 PG (ref 26.6–33)
MCHC RBC AUTO-ENTMCNC: 32.9 G/DL (ref 31.5–35.7)
MCV RBC AUTO: 89.7 FL (ref 79–97)
MONOCYTES # BLD AUTO: 0.35 10*3/MM3 (ref 0.1–0.9)
MONOCYTES NFR BLD AUTO: 4.6 % (ref 5–12)
NEUTROPHILS NFR BLD AUTO: 6.35 10*3/MM3 (ref 1.7–7)
NEUTROPHILS NFR BLD AUTO: 82.6 % (ref 42.7–76)
NRBC BLD AUTO-RTO: 0 /100 WBC (ref 0–0.2)
NT-PROBNP SERPL-MCNC: 43.5 PG/ML (ref 0–900)
PLATELET # BLD AUTO: 264 10*3/MM3 (ref 140–450)
PMV BLD AUTO: 10 FL (ref 6–12)
POTASSIUM SERPL-SCNC: 4.3 MMOL/L (ref 3.5–5.2)
PROT SERPL-MCNC: 7 G/DL (ref 6–8.5)
RBC # BLD AUTO: 4.77 10*6/MM3 (ref 3.77–5.28)
SODIUM SERPL-SCNC: 136 MMOL/L (ref 136–145)
WBC NRBC COR # BLD: 7.68 10*3/MM3 (ref 3.4–10.8)

## 2022-02-21 PROBLEM — R06.02 SHORTNESS OF BREATH: Status: ACTIVE | Noted: 2022-02-21

## 2022-02-21 PROBLEM — G47.33 OBSTRUCTIVE SLEEP APNEA: Status: ACTIVE | Noted: 2022-02-21

## 2022-02-21 PROBLEM — M62.81 MUSCULAR WEAKNESS: Status: ACTIVE | Noted: 2022-02-21

## 2022-02-21 LAB — ACETYLCHOLINE MODULATING AB: 0 %

## 2022-02-22 LAB
ACHR BIND AB SER-SCNC: <0.03 NMOL/L (ref 0–0.24)
ACHR BLOCK AB SER-ACNC: 13 % (ref 0–25)
ACHR MOD AB/ACHR TOTAL SFR SER: NORMAL %

## 2022-03-09 ENCOUNTER — TELEPHONE (OUTPATIENT)
Dept: PULMONOLOGY | Facility: CLINIC | Age: 72
End: 2022-03-09

## 2022-03-14 ENCOUNTER — HOSPITAL ENCOUNTER (OUTPATIENT)
Dept: CT IMAGING | Facility: HOSPITAL | Age: 72
Discharge: HOME | End: 2022-03-14

## 2022-03-14 ENCOUNTER — HOSPITAL ENCOUNTER (OUTPATIENT)
Dept: CARDIOLOGY | Facility: HOSPITAL | Age: 72
Discharge: HOME | End: 2022-03-14

## 2022-03-14 DIAGNOSIS — M62.81 MUSCULAR WEAKNESS: ICD-10-CM

## 2022-03-14 DIAGNOSIS — R06.02 SHORTNESS OF BREATH: ICD-10-CM

## 2022-03-14 PROCEDURE — 71250 CT THORAX DX C-: CPT | Performed by: RADIOLOGY

## 2022-03-14 PROCEDURE — 93306 TTE W/DOPPLER COMPLETE: CPT

## 2022-03-14 PROCEDURE — 70490 CT SOFT TISSUE NECK W/O DYE: CPT | Performed by: RADIOLOGY

## 2022-03-14 PROCEDURE — 71250 CT THORAX DX C-: CPT

## 2022-03-14 PROCEDURE — 70490 CT SOFT TISSUE NECK W/O DYE: CPT

## 2022-03-14 PROCEDURE — 93306 TTE W/DOPPLER COMPLETE: CPT | Performed by: INTERNAL MEDICINE

## 2022-03-15 LAB
BH CV ECHO MEAS - % IVS THICK: 37.2 %
BH CV ECHO MEAS - % LVPW THICK: 73 %
BH CV ECHO MEAS - ACS: 2 CM
BH CV ECHO MEAS - AO MAX PG: 14 MMHG
BH CV ECHO MEAS - AO MEAN PG: 6 MMHG
BH CV ECHO MEAS - AO ROOT AREA (BSA CORRECTED): 1.3
BH CV ECHO MEAS - AO ROOT AREA: 6.6 CM^2
BH CV ECHO MEAS - AO ROOT DIAM: 2.9 CM
BH CV ECHO MEAS - AO V2 MAX: 187 CM/SEC
BH CV ECHO MEAS - AO V2 MEAN: 108 CM/SEC
BH CV ECHO MEAS - AO V2 VTI: 28.4 CM
BH CV ECHO MEAS - BSA(HAYCOCK): 2.3 M^2
BH CV ECHO MEAS - BSA: 2.2 M^2
BH CV ECHO MEAS - BZI_BMI: 41.6 KILOGRAMS/M^2
BH CV ECHO MEAS - BZI_METRIC_HEIGHT: 165.1 CM
BH CV ECHO MEAS - BZI_METRIC_WEIGHT: 113.4 KG
BH CV ECHO MEAS - EDV(CUBED): 86.4 ML
BH CV ECHO MEAS - EDV(MOD-SP4): 49.8 ML
BH CV ECHO MEAS - EDV(TEICH): 88.6 ML
BH CV ECHO MEAS - EF(CUBED): 75 %
BH CV ECHO MEAS - EF(MOD-SP4): 51.8 %
BH CV ECHO MEAS - EF(TEICH): 67.1 %
BH CV ECHO MEAS - ESV(CUBED): 21.6 ML
BH CV ECHO MEAS - ESV(MOD-SP4): 24 ML
BH CV ECHO MEAS - ESV(TEICH): 29.2 ML
BH CV ECHO MEAS - FS: 37 %
BH CV ECHO MEAS - IVS/LVPW: 0.85
BH CV ECHO MEAS - IVSD: 0.98 CM
BH CV ECHO MEAS - IVSS: 1.3 CM
BH CV ECHO MEAS - LA DIMENSION: 2.6 CM
BH CV ECHO MEAS - LA/AO: 0.9
BH CV ECHO MEAS - LV DIASTOLIC VOL/BSA (35-75): 22.9 ML/M^2
BH CV ECHO MEAS - LV MASS(C)D: 162.2 GRAMS
BH CV ECHO MEAS - LV MASS(C)DI: 74.6 GRAMS/M^2
BH CV ECHO MEAS - LV MASS(C)S: 172.9 GRAMS
BH CV ECHO MEAS - LV MASS(C)SI: 79.5 GRAMS/M^2
BH CV ECHO MEAS - LV SYSTOLIC VOL/BSA (12-30): 11 ML/M^2
BH CV ECHO MEAS - LVIDD: 4.4 CM
BH CV ECHO MEAS - LVIDS: 2.8 CM
BH CV ECHO MEAS - LVLD AP4: 7.8 CM
BH CV ECHO MEAS - LVLS AP4: 7 CM
BH CV ECHO MEAS - LVOT AREA (M): 3.1 CM^2
BH CV ECHO MEAS - LVOT AREA: 3.1 CM^2
BH CV ECHO MEAS - LVOT DIAM: 2 CM
BH CV ECHO MEAS - LVPWD: 1.2 CM
BH CV ECHO MEAS - LVPWS: 2 CM
BH CV ECHO MEAS - MV A MAX VEL: 101 CM/SEC
BH CV ECHO MEAS - MV E MAX VEL: 90 CM/SEC
BH CV ECHO MEAS - MV E/A: 0.89
BH CV ECHO MEAS - PA ACC TIME: 0.1 SEC
BH CV ECHO MEAS - PA PR(ACCEL): 33.1 MMHG
BH CV ECHO MEAS - RAP SYSTOLE: 10 MMHG
BH CV ECHO MEAS - RVSP: 33.6 MMHG
BH CV ECHO MEAS - SI(AO): 86.3 ML/M^2
BH CV ECHO MEAS - SI(CUBED): 29.8 ML/M^2
BH CV ECHO MEAS - SI(MOD-SP4): 11.9 ML/M^2
BH CV ECHO MEAS - SI(TEICH): 27.3 ML/M^2
BH CV ECHO MEAS - SV(AO): 187.6 ML
BH CV ECHO MEAS - SV(CUBED): 64.7 ML
BH CV ECHO MEAS - SV(MOD-SP4): 25.8 ML
BH CV ECHO MEAS - SV(TEICH): 59.5 ML
BH CV ECHO MEAS - TR MAX VEL: 243 CM/SEC
MAXIMAL PREDICTED HEART RATE: 151 BPM
STRESS TARGET HR: 128 BPM

## 2022-04-13 ENCOUNTER — OFFICE VISIT (OUTPATIENT)
Dept: GASTROENTEROLOGY | Facility: CLINIC | Age: 72
End: 2022-04-13

## 2022-04-13 VITALS
SYSTOLIC BLOOD PRESSURE: 124 MMHG | BODY MASS INDEX: 39.28 KG/M2 | HEIGHT: 66 IN | DIASTOLIC BLOOD PRESSURE: 78 MMHG | HEART RATE: 102 BPM | WEIGHT: 244.4 LBS

## 2022-04-13 DIAGNOSIS — R19.7 DIARRHEA, UNSPECIFIED TYPE: ICD-10-CM

## 2022-04-13 DIAGNOSIS — K21.9 GASTROESOPHAGEAL REFLUX DISEASE WITHOUT ESOPHAGITIS: Primary | ICD-10-CM

## 2022-04-13 DIAGNOSIS — R10.84 GENERALIZED ABDOMINAL PAIN: ICD-10-CM

## 2022-04-13 PROCEDURE — 99214 OFFICE O/P EST MOD 30 MIN: CPT | Performed by: PHYSICIAN ASSISTANT

## 2022-04-13 RX ORDER — LANSOPRAZOLE 30 MG/1
30 CAPSULE, DELAYED RELEASE ORAL
Qty: 60 CAPSULE | Refills: 11 | Status: SHIPPED | OUTPATIENT
Start: 2022-04-13 | End: 2022-08-15 | Stop reason: SDUPTHER

## 2022-04-13 RX ORDER — SUCRALFATE ORAL 1 G/10ML
1 SUSPENSION ORAL 4 TIMES DAILY
Qty: 1200 ML | Refills: 0 | Status: ON HOLD | OUTPATIENT
Start: 2022-04-13 | End: 2022-07-20

## 2022-04-13 RX ORDER — BACLOFEN 10 MG/1
10 TABLET ORAL 2 TIMES DAILY
Qty: 60 TABLET | Refills: 11 | Status: ON HOLD | OUTPATIENT
Start: 2022-04-13 | End: 2022-07-20

## 2022-04-13 NOTE — PROGRESS NOTES
Chief Complaint   Patient presents with   • Abdominal Pain   • Diarrhea   • Heartburn       Lorenza David is a 71 y.o. female who presents to the office today for evaluation of Abdominal Pain, Diarrhea, and Heartburn  .    HPI  Patient presents the clinic today for evaluation of abdominal pain, diarrhea and severe heartburn/reflux symptoms.  Patient states that she has had GI issues for several years now and symptoms here recently have been worsening.  Patient states that she has had a increase in heartburn/reflux that have not been well controlled with medications-patient has previously took omeprazole, Protonix and Pepcid without relief.  Patient states that she is tries to avoid spicy and acidic foods that she knows will worsen symptoms.  Patient here recently has also been experiencing a very crampy abdominal pain that is occurring in the lower abdomen.  She states that she has not experienced any bloating or constipation.  She states bowel movements are occurring daily however they are type V-VII on the Wakeman stool scale.  She states at times these bowel movements can be yellowish in color however not often.  She has noticed occasional bright red blood per rectum after bowel movements.  Patient had a colonoscopy performed roughly 7 years ago that was considered normal.  She did have some issues with her pancreas going on as well back in December they told her there was some increased fat on the head of the pancreas which could have resulted in her pancreatitis episode.  Review of Systems   Constitutional: Negative.    HENT: Negative.    Eyes: Negative.    Respiratory: Negative.    Cardiovascular: Negative.    Gastrointestinal: Positive for abdominal distention, abdominal pain, constipation, diarrhea, nausea and vomiting. Negative for anal bleeding and blood in stool.   Endocrine: Negative.    Genitourinary: Negative.    Musculoskeletal: Negative.    Skin: Negative.    Allergic/Immunologic: Negative.     Neurological: Negative.    Hematological: Negative.    Psychiatric/Behavioral: Negative.        ACTIVE PROBLEMS:   Specialty Problems    None         PAST MEDICAL HISTORY:  Past Medical History:   Diagnosis Date   • Anemia    • Arthritis    • Asthma    • Colon polyp    • Fibromyalgia    • Hernia    • Hypertension    • IBS (irritable bowel syndrome)    • Migraine        SURGICAL HISTORY:  Past Surgical History:   Procedure Laterality Date   •  SECTION     • CHOLECYSTECTOMY     • DILATATION AND CURETTAGE     • EYE SURGERY     • HYSTERECTOMY         FAMILY HISTORY:  History reviewed. No pertinent family history.    SOCIAL HISTORY:  Social History     Tobacco Use   • Smoking status: Never Smoker   • Smokeless tobacco: Never Used   Substance Use Topics   • Alcohol use: No       CURRENT MEDICATION:    Current Outpatient Medications:   •  albuterol sulfate  (90 Base) MCG/ACT inhaler, Inhale 2 puffs Every 4 (Four) Hours As Needed for Wheezing or Shortness of Air., Disp: 18 g, Rfl: 8  •  aspirin 81 MG chewable tablet, Chew 81 mg Daily., Disp: , Rfl:   •  atorvastatin (LIPITOR) 20 MG tablet, Take 20 mg by mouth Every Night., Disp: , Rfl:   •  DULoxetine (CYMBALTA) 20 MG capsule, Take 20 mg by mouth Daily., Disp: , Rfl:   •  escitalopram (LEXAPRO) 20 MG tablet, Take 30 mg by mouth Daily., Disp: , Rfl:   •  fluticasone (FLONASE) 50 MCG/ACT nasal spray, 2 sprays into the nostril(s) as directed by provider Daily. Administer 2 sprays in each nostril for each dose., Disp: 16 g, Rfl: 6  •  folic acid (FOLVITE) 400 MCG tablet, Take 400 mcg by mouth Daily., Disp: , Rfl:   •  HYDROcodone-acetaminophen (NORCO) 7.5-325 MG per tablet, Take 1 tablet by mouth Every 8 (Eight) Hours As Needed (pain)., Disp: 9 tablet, Rfl: 0  •  metFORMIN (GLUCOPHAGE) 500 MG tablet, Take 500 mg by mouth 2 (Two) Times a Day With Meals., Disp: , Rfl:   •  metoprolol succinate XL (TOPROL-XL) 25 MG 24 hr tablet, Take 25 mg by mouth Daily., Disp: ,  "Rfl:   •  mirtazapine (REMERON) 15 MG tablet, Take 15 mg by mouth every night at bedtime., Disp: , Rfl:   •  nitrofurantoin, macrocrystal-monohydrate, (MACROBID) 100 MG capsule, TAKE 1 CAPSULE BY MOUTH EVERY 12 HOURS FOR 10 DAYS, Disp: , Rfl:   •  nystatin (MYCOSTATIN) 639494 UNIT/GM powder, Apply  topically 2 (Two) Times a Day As Needed., Disp: , Rfl:   •  pregabalin (LYRICA) 100 MG capsule, Take 300 mg by mouth 2 (Two) Times a Day., Disp: , Rfl:   •  Tocilizumab 162 MG/0.9ML solution prefilled syringe, Inject 162 mg under the skin 1 (One) Time Per Week., Disp: , Rfl:   •  traMADol (ULTRAM) 50 MG tablet, Take 1 tablet by mouth Every 4 (Four) Hours As Needed for Moderate Pain ., Disp: 8 tablet, Rfl: 0  •  vitamin D (ERGOCALCIFEROL) 1.25 MG (56000 UT) capsule capsule, Take 50,000 Units by mouth 1 (One) Time Per Week., Disp: , Rfl:   •  baclofen (LIORESAL) 10 MG tablet, Take 1 tablet by mouth 2 (Two) Times a Day. Take 1 tablet 30 minutes prior to eating your two largest meals, Disp: 60 tablet, Rfl: 11  •  bisacodyl (DULCOLAX) 5 MG EC tablet, Take 4 tablets by mouth 1 (One) Time for 1 dose. Take 4 tablets at 4:00 PM the day before procedure, Disp: 4 tablet, Rfl: 0  •  lansoprazole (PREVACID) 30 MG capsule, Take 1 capsule by mouth Every Morning Before Breakfast. Take one capsule 30 minutes prior to eating breakfast, Disp: 60 capsule, Rfl: 11  •  magnesium citrate 1.745 GM/30ML solution solution, Take 296 mL by mouth Take As Directed for 2 doses. Take one full bottle at 4:00 PM and take second bottle at 10:00 PM., Disp: 592 mL, Rfl: 0  •  sucralfate (CARAFATE) 1 GM/10ML suspension, Take 10 mL by mouth 4 (Four) Times a Day., Disp: 1200 mL, Rfl: 0    ALLERGIES:  Sulfa antibiotics    VISIT VITALS:  /78 (BP Location: Left arm, Patient Position: Sitting, Cuff Size: Adult)   Pulse 102   Ht 166.4 cm (65.5\")   Wt 111 kg (244 lb 6.4 oz)   BMI 40.05 kg/m²   Physical Exam  Constitutional:       General: She is not in " acute distress.     Appearance: Normal appearance. She is well-developed. She is obese.   HENT:      Head: Normocephalic and atraumatic.   Eyes:      Pupils: Pupils are equal, round, and reactive to light.   Cardiovascular:      Rate and Rhythm: Normal rate and regular rhythm.      Heart sounds: Normal heart sounds.   Pulmonary:      Effort: Pulmonary effort is normal. No respiratory distress.      Breath sounds: Normal breath sounds. No wheezing, rhonchi or rales.   Abdominal:      General: Abdomen is flat. Bowel sounds are normal. There is no distension.      Palpations: Abdomen is soft. There is no mass.      Tenderness: There is no abdominal tenderness. There is no guarding or rebound.      Hernia: No hernia is present.   Musculoskeletal:         General: No swelling. Normal range of motion.      Cervical back: Normal range of motion and neck supple.      Right lower leg: No edema.      Left lower leg: No edema.   Skin:     General: Skin is warm and dry.   Neurological:      Mental Status: She is alert and oriented to person, place, and time.   Psychiatric:         Attention and Perception: Attention normal.         Mood and Affect: Mood normal.         Speech: Speech normal.         Behavior: Behavior normal. Behavior is cooperative.         Thought Content: Thought content normal.         Assessment/Plan   Due to patient's symptoms-I will go ahead and get her set up to have an EGD/colonoscopy performed by Dr. Tesfaye.  We will be using a magnesium citrate Dulcolax colon prep.  Both procedures were thoroughly explained to the patient she voiced understanding and agreement to treatment plan.  In the meantime while waiting on scopes I will change her heartburn medication to lansoprazole 30 mg twice daily and start her on Carafate 1 g up to 4 times daily.  Patient has had her gallbladder removed so reflux could be related to bile acid-she is going to try a trial of baclofen 10 mg twice daily with largest meals.    Diagnosis Plan   1. Gastroesophageal reflux disease without esophagitis  lansoprazole (PREVACID) 30 MG capsule    sucralfate (CARAFATE) 1 GM/10ML suspension    baclofen (LIORESAL) 10 MG tablet    Case Request    Case Request    bisacodyl (DULCOLAX) 5 MG EC tablet    magnesium citrate 1.745 GM/30ML solution solution   2. Generalized abdominal pain  Case Request    Case Request    bisacodyl (DULCOLAX) 5 MG EC tablet    magnesium citrate 1.745 GM/30ML solution solution   3. Diarrhea, unspecified type  Case Request    Case Request    bisacodyl (DULCOLAX) 5 MG EC tablet    magnesium citrate 1.745 GM/30ML solution solution       Return After procedure.                   This document has been electronically signed by Ruddy Mendoza PA-C  April 15, 2022 09:25 EDT    Part of this note may be an electronic transcription/translation of spoken language to printed text using the Dragon Dictation System.

## 2022-04-15 DIAGNOSIS — R10.84 GENERALIZED ABDOMINAL PAIN: ICD-10-CM

## 2022-04-15 DIAGNOSIS — R19.7 DIARRHEA, UNSPECIFIED TYPE: Primary | ICD-10-CM

## 2022-04-15 DIAGNOSIS — K21.9 GASTROESOPHAGEAL REFLUX DISEASE WITHOUT ESOPHAGITIS: ICD-10-CM

## 2022-04-15 RX ORDER — MAGNESIUM CARB/ALUMINUM HYDROX 105-160MG
296 TABLET,CHEWABLE ORAL TAKE AS DIRECTED
Qty: 592 ML | Refills: 0 | Status: SHIPPED | OUTPATIENT
Start: 2022-04-15 | End: 2022-07-20 | Stop reason: HOSPADM

## 2022-04-15 RX ORDER — BISACODYL 5 MG/1
20 TABLET, DELAYED RELEASE ORAL ONCE
Qty: 4 TABLET | Refills: 0 | Status: SHIPPED | OUTPATIENT
Start: 2022-04-15 | End: 2022-04-15

## 2022-05-17 ENCOUNTER — TELEPHONE (OUTPATIENT)
Dept: UROLOGY | Facility: CLINIC | Age: 72
End: 2022-05-17

## 2022-05-17 ENCOUNTER — HOSPITAL ENCOUNTER (OUTPATIENT)
Facility: HOSPITAL | Age: 72
Setting detail: HOSPITAL OUTPATIENT SURGERY
End: 2022-05-17
Attending: INTERNAL MEDICINE | Admitting: INTERNAL MEDICINE

## 2022-05-17 PROBLEM — K21.9 GASTROESOPHAGEAL REFLUX DISEASE WITHOUT ESOPHAGITIS: Status: ACTIVE | Noted: 2022-05-17

## 2022-05-17 PROBLEM — R10.84 GENERALIZED ABDOMINAL PAIN: Status: ACTIVE | Noted: 2022-05-17

## 2022-05-17 PROBLEM — R19.7 DIARRHEA: Status: ACTIVE | Noted: 2022-05-17

## 2022-05-21 ENCOUNTER — APPOINTMENT (OUTPATIENT)
Dept: GENERAL RADIOLOGY | Facility: HOSPITAL | Age: 72
End: 2022-05-21

## 2022-05-21 LAB
ALBUMIN SERPL-MCNC: 4.56 G/DL (ref 3.5–5.2)
ALBUMIN/GLOB SERPL: 1.9 G/DL
ALP SERPL-CCNC: 64 U/L (ref 39–117)
ALT SERPL W P-5'-P-CCNC: 63 U/L (ref 1–33)
ANION GAP SERPL CALCULATED.3IONS-SCNC: 14 MMOL/L (ref 5–15)
APTT PPP: 25.1 SECONDS (ref 26.5–34.5)
AST SERPL-CCNC: 48 U/L (ref 1–32)
BASOPHILS # BLD AUTO: 0.08 10*3/MM3 (ref 0–0.2)
BASOPHILS NFR BLD AUTO: 1.3 % (ref 0–1.5)
BILIRUB SERPL-MCNC: 0.5 MG/DL (ref 0–1.2)
BUN SERPL-MCNC: 6 MG/DL (ref 8–23)
BUN/CREAT SERPL: 6.6 (ref 7–25)
CALCIUM SPEC-SCNC: 9.8 MG/DL (ref 8.6–10.5)
CHLORIDE SERPL-SCNC: 99 MMOL/L (ref 98–107)
CO2 SERPL-SCNC: 23 MMOL/L (ref 22–29)
CREAT SERPL-MCNC: 0.91 MG/DL (ref 0.57–1)
CRP SERPL-MCNC: <0.3 MG/DL (ref 0–0.5)
D-LACTATE SERPL-SCNC: 2.4 MMOL/L (ref 0.5–2)
DEPRECATED RDW RBC AUTO: 47.8 FL (ref 37–54)
EGFRCR SERPLBLD CKD-EPI 2021: 68.4 ML/MIN/1.73
EOSINOPHIL # BLD AUTO: 0.22 10*3/MM3 (ref 0–0.4)
EOSINOPHIL NFR BLD AUTO: 3.7 % (ref 0.3–6.2)
ERYTHROCYTE [DISTWIDTH] IN BLOOD BY AUTOMATED COUNT: 14.5 % (ref 12.3–15.4)
ERYTHROCYTE [SEDIMENTATION RATE] IN BLOOD: 2 MM/HR (ref 0–30)
FLUAV RNA RESP QL NAA+PROBE: NOT DETECTED
FLUBV RNA RESP QL NAA+PROBE: NOT DETECTED
GLOBULIN UR ELPH-MCNC: 2.4 GM/DL
GLUCOSE SERPL-MCNC: 137 MG/DL (ref 65–99)
HCT VFR BLD AUTO: 43.4 % (ref 34–46.6)
HGB BLD-MCNC: 13.8 G/DL (ref 12–15.9)
HOLD SPECIMEN: NORMAL
HOLD SPECIMEN: NORMAL
IMM GRANULOCYTES # BLD AUTO: 0.01 10*3/MM3 (ref 0–0.05)
IMM GRANULOCYTES NFR BLD AUTO: 0.2 % (ref 0–0.5)
INR PPP: 0.92 (ref 0.9–1.1)
LIPASE SERPL-CCNC: 25 U/L (ref 13–60)
LYMPHOCYTES # BLD AUTO: 1.31 10*3/MM3 (ref 0.7–3.1)
LYMPHOCYTES NFR BLD AUTO: 21.8 % (ref 19.6–45.3)
MAGNESIUM SERPL-MCNC: 1.9 MG/DL (ref 1.6–2.4)
MCH RBC QN AUTO: 28.3 PG (ref 26.6–33)
MCHC RBC AUTO-ENTMCNC: 31.8 G/DL (ref 31.5–35.7)
MCV RBC AUTO: 89.1 FL (ref 79–97)
MONOCYTES # BLD AUTO: 0.46 10*3/MM3 (ref 0.1–0.9)
MONOCYTES NFR BLD AUTO: 7.7 % (ref 5–12)
NEUTROPHILS NFR BLD AUTO: 3.92 10*3/MM3 (ref 1.7–7)
NEUTROPHILS NFR BLD AUTO: 65.3 % (ref 42.7–76)
NRBC BLD AUTO-RTO: 0 /100 WBC (ref 0–0.2)
PLATELET # BLD AUTO: 293 10*3/MM3 (ref 140–450)
PMV BLD AUTO: 9.2 FL (ref 6–12)
POTASSIUM SERPL-SCNC: 3.8 MMOL/L (ref 3.5–5.2)
PROT SERPL-MCNC: 7 G/DL (ref 6–8.5)
PROTHROMBIN TIME: 12.6 SECONDS (ref 12.1–14.7)
RBC # BLD AUTO: 4.87 10*6/MM3 (ref 3.77–5.28)
SARS-COV-2 RNA RESP QL NAA+PROBE: NOT DETECTED
SODIUM SERPL-SCNC: 136 MMOL/L (ref 136–145)
TSH SERPL DL<=0.05 MIU/L-ACNC: 1.61 UIU/ML (ref 0.27–4.2)
WBC NRBC COR # BLD: 6 10*3/MM3 (ref 3.4–10.8)
WHOLE BLOOD HOLD COAG: NORMAL
WHOLE BLOOD HOLD SPECIMEN: NORMAL

## 2022-05-21 PROCEDURE — 96374 THER/PROPH/DIAG INJ IV PUSH: CPT

## 2022-05-21 PROCEDURE — 87636 SARSCOV2 & INF A&B AMP PRB: CPT | Performed by: EMERGENCY MEDICINE

## 2022-05-21 PROCEDURE — 85730 THROMBOPLASTIN TIME PARTIAL: CPT | Performed by: EMERGENCY MEDICINE

## 2022-05-21 PROCEDURE — 80053 COMPREHEN METABOLIC PANEL: CPT | Performed by: EMERGENCY MEDICINE

## 2022-05-21 PROCEDURE — 85652 RBC SED RATE AUTOMATED: CPT | Performed by: EMERGENCY MEDICINE

## 2022-05-21 PROCEDURE — 85025 COMPLETE CBC W/AUTO DIFF WBC: CPT | Performed by: EMERGENCY MEDICINE

## 2022-05-21 PROCEDURE — 84443 ASSAY THYROID STIM HORMONE: CPT | Performed by: EMERGENCY MEDICINE

## 2022-05-21 PROCEDURE — 99283 EMERGENCY DEPT VISIT LOW MDM: CPT

## 2022-05-21 PROCEDURE — 83735 ASSAY OF MAGNESIUM: CPT | Performed by: EMERGENCY MEDICINE

## 2022-05-21 PROCEDURE — 85610 PROTHROMBIN TIME: CPT | Performed by: EMERGENCY MEDICINE

## 2022-05-21 PROCEDURE — 36415 COLL VENOUS BLD VENIPUNCTURE: CPT

## 2022-05-21 PROCEDURE — 86140 C-REACTIVE PROTEIN: CPT | Performed by: EMERGENCY MEDICINE

## 2022-05-21 PROCEDURE — 93005 ELECTROCARDIOGRAM TRACING: CPT | Performed by: EMERGENCY MEDICINE

## 2022-05-21 PROCEDURE — 83690 ASSAY OF LIPASE: CPT | Performed by: EMERGENCY MEDICINE

## 2022-05-21 PROCEDURE — 83605 ASSAY OF LACTIC ACID: CPT | Performed by: EMERGENCY MEDICINE

## 2022-05-21 PROCEDURE — 71045 X-RAY EXAM CHEST 1 VIEW: CPT

## 2022-05-22 ENCOUNTER — APPOINTMENT (OUTPATIENT)
Dept: CT IMAGING | Facility: HOSPITAL | Age: 72
End: 2022-05-22

## 2022-05-22 ENCOUNTER — HOSPITAL ENCOUNTER (EMERGENCY)
Facility: HOSPITAL | Age: 72
Discharge: HOME | End: 2022-05-22
Attending: EMERGENCY MEDICINE | Admitting: EMERGENCY MEDICINE

## 2022-05-22 VITALS
HEIGHT: 65 IN | RESPIRATION RATE: 18 BRPM | SYSTOLIC BLOOD PRESSURE: 139 MMHG | TEMPERATURE: 98 F | HEART RATE: 65 BPM | BODY MASS INDEX: 39.99 KG/M2 | DIASTOLIC BLOOD PRESSURE: 75 MMHG | WEIGHT: 240 LBS | OXYGEN SATURATION: 98 %

## 2022-05-22 DIAGNOSIS — R11.2 NAUSEA AND VOMITING, UNSPECIFIED VOMITING TYPE: ICD-10-CM

## 2022-05-22 DIAGNOSIS — R10.84 DIFFUSE ABDOMINAL PAIN: Primary | ICD-10-CM

## 2022-05-22 LAB
BILIRUB UR QL STRIP: NEGATIVE
CLARITY UR: CLEAR
COLOR UR: YELLOW
D-LACTATE SERPL-SCNC: 1.7 MMOL/L (ref 0.5–2)
GLUCOSE UR STRIP-MCNC: NEGATIVE MG/DL
HGB UR QL STRIP.AUTO: NEGATIVE
KETONES UR QL STRIP: ABNORMAL
LEUKOCYTE ESTERASE UR QL STRIP.AUTO: NEGATIVE
NITRITE UR QL STRIP: NEGATIVE
PH UR STRIP.AUTO: 6.5 [PH] (ref 5–8)
PROT UR QL STRIP: NEGATIVE
QT INTERVAL: 414 MS
QTC INTERVAL: 453 MS
SP GR UR STRIP: >1.03 (ref 1–1.03)
UROBILINOGEN UR QL STRIP: ABNORMAL

## 2022-05-22 PROCEDURE — 25010000002 IOPAMIDOL 61 % SOLUTION: Performed by: EMERGENCY MEDICINE

## 2022-05-22 PROCEDURE — 83605 ASSAY OF LACTIC ACID: CPT | Performed by: EMERGENCY MEDICINE

## 2022-05-22 PROCEDURE — 74177 CT ABD & PELVIS W/CONTRAST: CPT

## 2022-05-22 PROCEDURE — 96374 THER/PROPH/DIAG INJ IV PUSH: CPT

## 2022-05-22 PROCEDURE — 25010000002 ONDANSETRON PER 1 MG: Performed by: NURSE PRACTITIONER

## 2022-05-22 PROCEDURE — 63710000001 ONDANSETRON ODT 4 MG TABLET DISPERSIBLE: Performed by: NURSE PRACTITIONER

## 2022-05-22 PROCEDURE — 81003 URINALYSIS AUTO W/O SCOPE: CPT | Performed by: EMERGENCY MEDICINE

## 2022-05-22 RX ORDER — ONDANSETRON 4 MG/1
4 TABLET, ORALLY DISINTEGRATING ORAL ONCE
Status: COMPLETED | OUTPATIENT
Start: 2022-05-22 | End: 2022-05-22

## 2022-05-22 RX ORDER — PROMETHAZINE HYDROCHLORIDE 25 MG/1
25 TABLET ORAL EVERY 6 HOURS PRN
Qty: 20 TABLET | Refills: 0 | Status: SHIPPED | OUTPATIENT
Start: 2022-05-22 | End: 2022-05-26

## 2022-05-22 RX ORDER — ONDANSETRON 2 MG/ML
4 INJECTION INTRAMUSCULAR; INTRAVENOUS ONCE
Status: COMPLETED | OUTPATIENT
Start: 2022-05-22 | End: 2022-05-22

## 2022-05-22 RX ORDER — SODIUM CHLORIDE 0.9 % (FLUSH) 0.9 %
10 SYRINGE (ML) INJECTION AS NEEDED
Status: DISCONTINUED | OUTPATIENT
Start: 2022-05-22 | End: 2022-05-22 | Stop reason: HOSPADM

## 2022-05-22 RX ADMIN — ONDANSETRON 4 MG: 2 INJECTION INTRAMUSCULAR; INTRAVENOUS at 04:00

## 2022-05-22 RX ADMIN — IOPAMIDOL 85 ML: 612 INJECTION, SOLUTION INTRAVENOUS at 01:47

## 2022-05-22 RX ADMIN — ONDANSETRON 4 MG: 4 TABLET, ORALLY DISINTEGRATING ORAL at 04:00

## 2022-05-22 RX ADMIN — SODIUM CHLORIDE 1000 ML: 9 INJECTION, SOLUTION INTRAVENOUS at 01:17

## 2022-05-22 NOTE — ED PROVIDER NOTES
Subjective   Patient is a 71-year-old female with significant past medical history positive for anemia, arthritis, asthma, fibromyalgia, hypertension, migraines presenting to the ER complaints of diffuse abdominal pain and vomiting.  Patient states that she is set to have an upper and lower scope by gastroenterology here at [facility] in 4 days for this abdominal pain.  Patient has been dealing with this for several weeks.  Patient states that yesterday she started having severe vomiting.  Patient unable to hold anything down.  Patient denies any fever, cough, shortness of breath, chest pain, headache or any additional symptoms at this time.      History provided by:  Patient   used: No        Review of Systems   Constitutional: Negative.  Negative for fever.   HENT: Negative.    Eyes: Negative.    Respiratory: Negative.  Negative for cough and shortness of breath.    Cardiovascular: Negative.  Negative for chest pain, palpitations and leg swelling.   Gastrointestinal: Positive for abdominal pain, nausea and vomiting.   Endocrine: Negative.    Genitourinary: Negative.  Negative for dysuria, flank pain and vaginal pain.   Musculoskeletal: Negative.    Skin: Negative.    Allergic/Immunologic: Negative.    Neurological: Negative.    Hematological: Negative.    Psychiatric/Behavioral: Negative.    All other systems reviewed and are negative.      Past Medical History:   Diagnosis Date   • Anemia    • Arthritis    • Asthma    • Colon polyp    • Fibromyalgia    • Hernia    • Hypertension    • IBS (irritable bowel syndrome)    • Migraine        Allergies   Allergen Reactions   • Sulfa Antibiotics        Past Surgical History:   Procedure Laterality Date   •  SECTION     • CHOLECYSTECTOMY     • DILATATION AND CURETTAGE     • EYE SURGERY     • HYSTERECTOMY         No family history on file.    Social History     Socioeconomic History   • Marital status:    Tobacco Use   • Smoking status:  Never Smoker   • Smokeless tobacco: Never Used   Vaping Use   • Vaping Use: Never used   Substance and Sexual Activity   • Alcohol use: No   • Drug use: No   • Sexual activity: Defer           Objective   Physical Exam  Vitals and nursing note reviewed.   Constitutional:       General: She is not in acute distress.     Appearance: Normal appearance. She is well-developed. She is obese. She is not ill-appearing, toxic-appearing or diaphoretic.   HENT:      Head: Normocephalic and atraumatic.      Right Ear: External ear normal.      Left Ear: External ear normal.      Nose: Nose normal.      Mouth/Throat:      Mouth: Mucous membranes are moist.      Pharynx: Oropharynx is clear. No oropharyngeal exudate or posterior oropharyngeal erythema.   Eyes:      Conjunctiva/sclera: Conjunctivae normal.      Pupils: Pupils are equal, round, and reactive to light.   Neck:      Vascular: No JVD.      Trachea: No tracheal deviation.   Cardiovascular:      Rate and Rhythm: Normal rate and regular rhythm.      Heart sounds: Normal heart sounds. No murmur heard.  Pulmonary:      Effort: Pulmonary effort is normal. No respiratory distress.      Breath sounds: Normal breath sounds. No wheezing.   Abdominal:      General: Bowel sounds are normal.      Palpations: Abdomen is soft.      Tenderness: There is abdominal tenderness.   Musculoskeletal:         General: No deformity. Normal range of motion.      Cervical back: Normal range of motion and neck supple.   Skin:     General: Skin is warm and dry.      Capillary Refill: Capillary refill takes less than 2 seconds.      Coloration: Skin is not pale.      Findings: No erythema or rash.   Neurological:      General: No focal deficit present.      Mental Status: She is alert and oriented to person, place, and time. Mental status is at baseline.      Cranial Nerves: No cranial nerve deficit.   Psychiatric:         Mood and Affect: Mood normal.         Behavior: Behavior normal.          Thought Content: Thought content normal.         Judgment: Judgment normal.         Procedures           ED Course  ED Course as of 05/22/22 0337   Sun May 22, 2022   0054 XR Chest 1 View  IMPRESSION:     1. Chronic lung changes, otherwise negative chest.     Signer Name: Moe Walker MD   Signed: 5/21/2022 10:27 PM   Workstation Name: [id number]    [facility] []   0252 CT Abdomen Pelvis With Contrast  IMPRESSION:  1. No acute abnormality within the abdomen or pelvis.  2. Mild lower colonic diverticulosis.  3. Hiatal hernia.  4. Cholecystectomy and hysterectomy.     Signer Name: Aniket Rizo MD   Signed: 5/22/2022 2:01 AM   Workstation Name: [id number]    [facility] [SM]   0337 Work up and results were discussed throughly with the patients.  The patient will be discharged for further monitoring and management with their PCP.  Red flags, warning signs, worsening symptoms, and when to return to the ER discussed with and understood by the patients.  Patient will follow up with their PCP in a timely manner.  Vitals stable at discharge.  [SM]   0337 This care is provided during an unprecedented national emergency due to the Novel Coronavirus (COVID-19). COVID-19 infections and transmission risks place heavy strains on healthcare resources. As this pandemic evolves, the Hospital and providers strive to respond fluidly, to remain operational, and to provide care relative to available resources and information. Outcomes are unpredictable and treatments are without well-defined guidelines. Further, the impact of COVID-19 on all aspects of emergency care, including the impact to patients seeking care for reasons other than COVID-19, is unavoidable during this national emergency.    This note was dictated using a [id number] tool. Occasional wrong-word or 'sound-a-like' substitutions may have occurred due to the inherent limitations of voice recognition software.   Read the chart carefully and recognize, using context, where substitutions have occurred.  [SM]      ED Course User Index  [SM] Alana Pickens, APRN                                                 [facility]    Final diagnoses:   Diffuse abdominal pain   Nausea and vomiting, unspecified vomiting type       ED Disposition  ED Disposition     ED Disposition   Discharge    Condition   Stable    Comment   --             Sabas Verde MD  [address] [facility]  [place] [address] [phone]    Schedule an appointment as soon as possible for a visit in 2 days           Medication List      New Prescriptions    promethazine 25 MG tablet  Commonly known as: PHENERGAN  Take 1 tablet by mouth Every 6 (Six) Hours As Needed for Nausea or Vomiting for up to 4 days.           Where to Get Your Medications      These medications were sent to [facility][id number] - [address] AT [facility] [address] [facility] - [phone]  - [phone] [facility] [facility] [address]    Phone: [phone]   · promethazine 25 MG tablet          Alana Pickens, APRN  05/22/22 0330       Alana Pickens, APRN  05/22/22 0332

## 2022-05-23 ENCOUNTER — LAB (OUTPATIENT)
Dept: LAB | Facility: HOSPITAL | Age: 72
End: 2022-05-23

## 2022-05-23 DIAGNOSIS — R19.7 DIARRHEA, UNSPECIFIED TYPE: ICD-10-CM

## 2022-05-23 DIAGNOSIS — K21.9 GASTROESOPHAGEAL REFLUX DISEASE WITHOUT ESOPHAGITIS: ICD-10-CM

## 2022-05-23 DIAGNOSIS — R10.84 GENERALIZED ABDOMINAL PAIN: ICD-10-CM

## 2022-05-23 NOTE — TELEPHONE ENCOUNTER
"Patient is cancelling colonoscopy for now. She stated she went to urgent care in [place] and they gave her abx due to \"diverticulitis\" on CT scan from the hospital. Looks like it was just diverticulosis. She said she would call back to reschedule. She said her pain was too bad to drink the prep.   "

## 2022-06-07 DIAGNOSIS — R19.7 DIARRHEA, UNSPECIFIED TYPE: ICD-10-CM

## 2022-06-07 DIAGNOSIS — R10.84 GENERALIZED ABDOMINAL PAIN: ICD-10-CM

## 2022-06-07 DIAGNOSIS — K21.9 GASTROESOPHAGEAL REFLUX DISEASE WITHOUT ESOPHAGITIS: Primary | ICD-10-CM

## 2022-07-18 ENCOUNTER — LAB (OUTPATIENT)
Dept: LAB | Facility: HOSPITAL | Age: 72
End: 2022-07-18

## 2022-07-18 DIAGNOSIS — R19.7 DIARRHEA, UNSPECIFIED TYPE: ICD-10-CM

## 2022-07-18 DIAGNOSIS — K21.9 GASTROESOPHAGEAL REFLUX DISEASE WITHOUT ESOPHAGITIS: ICD-10-CM

## 2022-07-18 DIAGNOSIS — R10.84 GENERALIZED ABDOMINAL PAIN: ICD-10-CM

## 2022-07-19 ENCOUNTER — APPOINTMENT (OUTPATIENT)
Dept: LAB | Facility: HOSPITAL | Age: 72
End: 2022-07-19

## 2022-07-19 ENCOUNTER — DOCUMENTATION (OUTPATIENT)
Dept: GASTROENTEROLOGY | Facility: CLINIC | Age: 72
End: 2022-07-19

## 2022-07-19 NOTE — PROGRESS NOTES
Gave v/o for Miralax powder bowel prep for patient to her pharamcy. She called office and was told magnesium citrate has been recalled. I told her directions on how to take the Miralax as well as pharmacy on their voicemail

## 2022-07-20 ENCOUNTER — ANESTHESIA (OUTPATIENT)
Dept: PERIOP | Facility: HOSPITAL | Age: 72
End: 2022-07-20

## 2022-07-20 ENCOUNTER — LAB (OUTPATIENT)
Dept: LAB | Facility: HOSPITAL | Age: 72
End: 2022-07-20

## 2022-07-20 ENCOUNTER — HOSPITAL ENCOUNTER (OUTPATIENT)
Facility: HOSPITAL | Age: 72
Setting detail: HOSPITAL OUTPATIENT SURGERY
Discharge: HOME | End: 2022-07-20
Attending: INTERNAL MEDICINE | Admitting: INTERNAL MEDICINE

## 2022-07-20 ENCOUNTER — ANESTHESIA EVENT (OUTPATIENT)
Dept: PERIOP | Facility: HOSPITAL | Age: 72
End: 2022-07-20

## 2022-07-20 VITALS
SYSTOLIC BLOOD PRESSURE: 124 MMHG | OXYGEN SATURATION: 95 % | WEIGHT: 240 LBS | DIASTOLIC BLOOD PRESSURE: 82 MMHG | TEMPERATURE: 97 F | RESPIRATION RATE: 18 BRPM | BODY MASS INDEX: 38.57 KG/M2 | HEART RATE: 71 BPM | HEIGHT: 66 IN

## 2022-07-20 DIAGNOSIS — R19.7 DIARRHEA, UNSPECIFIED TYPE: ICD-10-CM

## 2022-07-20 DIAGNOSIS — Z01.818 OTHER SPECIFIED PRE-OPERATIVE EXAMINATION: Primary | ICD-10-CM

## 2022-07-20 DIAGNOSIS — R10.84 GENERALIZED ABDOMINAL PAIN: ICD-10-CM

## 2022-07-20 DIAGNOSIS — K21.9 GASTROESOPHAGEAL REFLUX DISEASE WITHOUT ESOPHAGITIS: ICD-10-CM

## 2022-07-20 LAB
FLUAV RNA RESP QL NAA+PROBE: NOT DETECTED
FLUBV RNA RESP QL NAA+PROBE: NOT DETECTED
SARS-COV-2 RNA RESP QL NAA+PROBE: NOT DETECTED

## 2022-07-20 PROCEDURE — 45380 COLONOSCOPY AND BIOPSY: CPT | Performed by: INTERNAL MEDICINE

## 2022-07-20 PROCEDURE — 88305 TISSUE EXAM BY PATHOLOGIST: CPT

## 2022-07-20 PROCEDURE — 87636 SARSCOV2 & INF A&B AMP PRB: CPT

## 2022-07-20 PROCEDURE — 45385 COLONOSCOPY W/LESION REMOVAL: CPT | Performed by: INTERNAL MEDICINE

## 2022-07-20 PROCEDURE — 25010000002 PROPOFOL 10 MG/ML EMULSION: Performed by: NURSE ANESTHETIST, CERTIFIED REGISTERED

## 2022-07-20 PROCEDURE — 43239 EGD BIOPSY SINGLE/MULTIPLE: CPT | Performed by: INTERNAL MEDICINE

## 2022-07-20 PROCEDURE — C9803 HOPD COVID-19 SPEC COLLECT: HCPCS

## 2022-07-20 RX ORDER — OXYCODONE HYDROCHLORIDE AND ACETAMINOPHEN 5; 325 MG/1; MG/1
1 TABLET ORAL ONCE AS NEEDED
Status: DISCONTINUED | OUTPATIENT
Start: 2022-07-20 | End: 2022-07-20 | Stop reason: HOSPADM

## 2022-07-20 RX ORDER — PREDNISONE 1 MG/1
5 TABLET ORAL DAILY
COMMUNITY

## 2022-07-20 RX ORDER — ONDANSETRON 2 MG/ML
4 INJECTION INTRAMUSCULAR; INTRAVENOUS AS NEEDED
Status: DISCONTINUED | OUTPATIENT
Start: 2022-07-20 | End: 2022-07-20 | Stop reason: HOSPADM

## 2022-07-20 RX ORDER — FENTANYL CITRATE 50 UG/ML
50 INJECTION, SOLUTION INTRAMUSCULAR; INTRAVENOUS
Status: DISCONTINUED | OUTPATIENT
Start: 2022-07-20 | End: 2022-07-20 | Stop reason: HOSPADM

## 2022-07-20 RX ORDER — MONTELUKAST SODIUM 4 MG/1
1 TABLET, CHEWABLE ORAL DAILY
Qty: 30 TABLET | Refills: 2 | Status: SHIPPED | OUTPATIENT
Start: 2022-07-20

## 2022-07-20 RX ORDER — IPRATROPIUM BROMIDE AND ALBUTEROL SULFATE 2.5; .5 MG/3ML; MG/3ML
3 SOLUTION RESPIRATORY (INHALATION) ONCE AS NEEDED
Status: DISCONTINUED | OUTPATIENT
Start: 2022-07-20 | End: 2022-07-20 | Stop reason: HOSPADM

## 2022-07-20 RX ORDER — SODIUM CHLORIDE, SODIUM LACTATE, POTASSIUM CHLORIDE, CALCIUM CHLORIDE 600; 310; 30; 20 MG/100ML; MG/100ML; MG/100ML; MG/100ML
INJECTION, SOLUTION INTRAVENOUS CONTINUOUS PRN
Status: DISCONTINUED | OUTPATIENT
Start: 2022-07-20 | End: 2022-07-20 | Stop reason: SURG

## 2022-07-20 RX ORDER — MEPERIDINE HYDROCHLORIDE 25 MG/ML
12.5 INJECTION INTRAMUSCULAR; INTRAVENOUS; SUBCUTANEOUS
Status: DISCONTINUED | OUTPATIENT
Start: 2022-07-20 | End: 2022-07-20 | Stop reason: HOSPADM

## 2022-07-20 RX ORDER — SODIUM CHLORIDE 0.9 % (FLUSH) 0.9 %
10 SYRINGE (ML) INJECTION AS NEEDED
Status: DISCONTINUED | OUTPATIENT
Start: 2022-07-20 | End: 2022-07-20 | Stop reason: HOSPADM

## 2022-07-20 RX ORDER — FAMOTIDINE 20 MG/1
20 TABLET, FILM COATED ORAL 2 TIMES DAILY
COMMUNITY
Start: 2022-05-04 | End: 2022-08-15

## 2022-07-20 RX ORDER — SODIUM CHLORIDE 0.9 % (FLUSH) 0.9 %
10 SYRINGE (ML) INJECTION EVERY 12 HOURS SCHEDULED
Status: DISCONTINUED | OUTPATIENT
Start: 2022-07-20 | End: 2022-07-20 | Stop reason: HOSPADM

## 2022-07-20 RX ORDER — KETOROLAC TROMETHAMINE 30 MG/ML
15 INJECTION, SOLUTION INTRAMUSCULAR; INTRAVENOUS EVERY 6 HOURS PRN
Status: DISCONTINUED | OUTPATIENT
Start: 2022-07-20 | End: 2022-07-20 | Stop reason: HOSPADM

## 2022-07-20 RX ORDER — SODIUM CHLORIDE, SODIUM LACTATE, POTASSIUM CHLORIDE, CALCIUM CHLORIDE 600; 310; 30; 20 MG/100ML; MG/100ML; MG/100ML; MG/100ML
100 INJECTION, SOLUTION INTRAVENOUS ONCE AS NEEDED
Status: DISCONTINUED | OUTPATIENT
Start: 2022-07-20 | End: 2022-07-20 | Stop reason: HOSPADM

## 2022-07-20 RX ORDER — PROPOFOL 10 MG/ML
VIAL (ML) INTRAVENOUS AS NEEDED
Status: DISCONTINUED | OUTPATIENT
Start: 2022-07-20 | End: 2022-07-20 | Stop reason: SURG

## 2022-07-20 RX ORDER — MIDAZOLAM HYDROCHLORIDE 1 MG/ML
0.5 INJECTION INTRAMUSCULAR; INTRAVENOUS
Status: DISCONTINUED | OUTPATIENT
Start: 2022-07-20 | End: 2022-07-20 | Stop reason: HOSPADM

## 2022-07-20 RX ORDER — AMLODIPINE BESYLATE 5 MG/1
5 TABLET ORAL DAILY
COMMUNITY
Start: 2022-06-28

## 2022-07-20 RX ORDER — SODIUM CHLORIDE, SODIUM LACTATE, POTASSIUM CHLORIDE, CALCIUM CHLORIDE 600; 310; 30; 20 MG/100ML; MG/100ML; MG/100ML; MG/100ML
125 INJECTION, SOLUTION INTRAVENOUS ONCE
Status: DISCONTINUED | OUTPATIENT
Start: 2022-07-20 | End: 2022-07-20 | Stop reason: HOSPADM

## 2022-07-20 RX ADMIN — SODIUM CHLORIDE, POTASSIUM CHLORIDE, SODIUM LACTATE AND CALCIUM CHLORIDE: 600; 310; 30; 20 INJECTION, SOLUTION INTRAVENOUS at 10:44

## 2022-07-20 RX ADMIN — PROPOFOL 50 MG: 10 INJECTION, EMULSION INTRAVENOUS at 10:44

## 2022-07-20 RX ADMIN — PROPOFOL 140 MCG/KG/MIN: 10 INJECTION, EMULSION INTRAVENOUS at 10:44

## 2022-07-20 NOTE — H&P
[facility]    Patient Identification:  Name:  Lorenza David  Age:  71 y.o.  Sex:  female  :  1950  MRN:  [id number]   Visit Number:  [id number]  Primary Care Physician:  Sabas Verde MD       Chief complaint: Diarrhea, reflux, abdominal pain, bright red blood per rectum    History of presenting illness:  71 y.o. female presents today for EGD and colonoscopy secondary to abdominal pain, heartburn reflux and diarrhea.  Patient relates a history of increased heartburn and reflux which has not been controlled on medication.  She has tried omeprazole, Protonix and Pepcid without relief.  Recently, she was placed on lansoprazole with some improvement particularly with nausea and vomiting.  She does try to avoid certain foods for fear of worsening her symptoms.  These are generally spicy or acidic foods.  She does complain of crampy abdominal pain in her lower abdomen on both the right and left side.  There is associated loose bowel movements.  She states her stool can be a type V-VII on the Paron stool scale.  She has noticed occasional bright red blood per rectum after bowel movements.  She did have a colonoscopy about 7 years ago which was reportedly normal.  Of note, she was told that she had fat in the head of her pancreas.  She denies weight loss.  There is no family history of colon cancer.  The patient has had her gallbladder removed.  ---------------------------------------------------------------------------------------------------------------------   Review of Systems   Constitutional: Negative for activity change, appetite change, chills, fatigue, fever and unexpected weight change.   HENT: Negative for mouth sores and trouble swallowing.    Eyes: Negative for photophobia, pain and redness.   Respiratory: Negative for cough and choking.    Cardiovascular: Negative for chest pain and leg swelling.   Gastrointestinal: Positive for abdominal pain, anal  bleeding, diarrhea, nausea and vomiting. Negative for abdominal distention, constipation and rectal pain.   Endocrine: Negative for cold intolerance, heat intolerance and polyphagia.   Genitourinary: Negative for difficulty urinating and dysuria.   Musculoskeletal: Negative for arthralgias, joint swelling and myalgias.   Skin: Negative for rash and wound.   Allergic/Immunologic: Negative for environmental allergies and food allergies.   Neurological: Negative for dizziness and light-headedness.   Hematological: Negative for adenopathy. Does not bruise/bleed easily.   Psychiatric/Behavioral: Negative for sleep disturbance. The patient is not nervous/anxious.       ---------------------------------------------------------------------------------------------------------------------   Past Medical History:   Diagnosis Date   • Anemia    • Arthritis    • Asthma    • Colon polyp    • Diabetes mellitus (HCC)    • Fibromyalgia    • GERD (gastroesophageal reflux disease)    • Hernia    • Hypertension    • IBS (irritable bowel syndrome)    • Migraine    • Sleep apnea      Past Surgical History:   Procedure Laterality Date   •  SECTION     • CHOLECYSTECTOMY     • COLONOSCOPY     • DILATATION AND CURETTAGE     • EYE SURGERY     • HYSTERECTOMY       History reviewed. No pertinent family history.  Social History     Socioeconomic History   • Marital status:    Tobacco Use   • Smoking status: Never Smoker   • Smokeless tobacco: Never Used   Vaping Use   • Vaping Use: Never used   Substance and Sexual Activity   • Alcohol use: No   • Drug use: No   • Sexual activity: Defer     ---------------------------------------------------------------------------------------------------------------------   Allergies:  Sulfa antibiotics  ---------------------------------------------------------------------------------------------------------------------   Prior to Admission Medications     Prescriptions Last Dose Informant  Patient Reported? Taking?    albuterol sulfate  (90 Base) MCG/ACT inhaler   No No    Inhale 2 puffs Every 4 (Four) Hours As Needed for Wheezing or Shortness of Air.    aspirin 81 MG chewable tablet   Yes No    Chew 81 mg Daily.    atorvastatin (LIPITOR) 20 MG tablet   Yes No    Take 20 mg by mouth Every Night.    DULoxetine (CYMBALTA) 20 MG capsule   Yes No    Take 20 mg by mouth Daily.    escitalopram (LEXAPRO) 20 MG tablet   Yes No    Take 30 mg by mouth Daily.    fluticasone (FLONASE) 50 MCG/ACT nasal spray   No No    2 sprays into the nostril(s) as directed by provider Daily. Administer 2 sprays in each nostril for each dose.    folic acid (FOLVITE) 400 MCG tablet   Yes No    Take 400 mcg by mouth Daily.    HYDROcodone-acetaminophen (NORCO) 7.5-325 MG per tablet   No No    Take 1 tablet by mouth Every 8 (Eight) Hours As Needed (pain).    metFORMIN (GLUCOPHAGE) 500 MG tablet   Yes No    Take 500 mg by mouth 2 (Two) Times a Day With Meals.    metoprolol succinate XL (TOPROL-XL) 25 MG 24 hr tablet   Yes No    Take 25 mg by mouth Daily.    mirtazapine (REMERON) 15 MG tablet   Yes No    Take 15 mg by mouth every night at bedtime.    nitrofurantoin, macrocrystal-monohydrate, (MACROBID) 100 MG capsule   Yes No    TAKE 1 CAPSULE BY MOUTH EVERY 12 HOURS FOR 10 DAYS    nystatin (MYCOSTATIN) 238874 UNIT/GM powder   Yes No    Apply  topically 2 (Two) Times a Day As Needed.    pregabalin (LYRICA) 100 MG capsule   Yes No    Take 300 mg by mouth 2 (Two) Times a Day.    Tocilizumab 162 MG/0.9ML solution prefilled syringe   Yes No    Inject 162 mg under the skin 1 (One) Time Per Week.    traMADol (ULTRAM) 50 MG tablet   No No    Take 1 tablet by mouth Every 4 (Four) Hours As Needed for Moderate Pain .    vitamin D (ERGOCALCIFEROL) 1.25 MG (35936 UT) capsule capsule   Yes No    Take 50,000 Units by mouth 1 (One) Time Per Week.    baclofen (LIORESAL) 10 MG tablet   No No    Take 1 tablet by mouth 2 (Two) Times a Day. Take 1  tablet 30 minutes prior to eating your two largest meals    lansoprazole (PREVACID) 30 MG capsule   No No    Take 1 capsule by mouth Every Morning Before Breakfast. Take one capsule 30 minutes prior to eating breakfast    magnesium citrate 1.745 GM/30ML solution solution   No No    Take 296 mL by mouth Take As Directed for 2 doses. Take one full bottle at 4:00 PM and take second bottle at 10:00 PM.    sucralfate (CARAFATE) 1 GM/10ML suspension   No No    Take 10 mL by mouth 4 (Four) Times a Day.        Hospital Scheduled Meds:       ---------------------------------------------------------------------------------------------------------------------   Vital Signs:  Temp:  [98.5 °F (36.9 °C)] 98.5 °F (36.9 °C)  Heart Rate:  [78] 78  Resp:  [20] 20  BP: (131)/(63) 131/63      07/20/22  1015   Weight: 109 kg (240 lb)     Body mass index is 39.33 kg/m².  ---------------------------------------------------------------------------------------------------------------------   Physical Exam:  Constitutional:  Well-developed and well-nourished.  No respiratory distress.      Obese  HENT:  Head: Normocephalic and atraumatic.  Mouth:  Moist mucous membranes.    Eyes:  Conjunctivae and EOM are normal.  Pupils are equal, round, and reactive to light.  No scleral icterus.  Neck:  Neck supple.  No JVD present.    Cardiovascular:  Normal rate, regular rhythm and normal heart sounds with no murmur.  Pulmonary/Chest:  No respiratory distress, no wheezes, no crackles, with normal breath sounds and good air movement.  Abdominal:  Soft.  Bowel sounds are normal.  No distension and mild tenderness in the both the left and right abdomen.  Musculoskeletal:  No edema, no tenderness, and no deformity.  No red or swollen joints anywhere.    Neurological:  Alert and oriented to person, place, and time.  No cranial nerve deficit.  No tongue deviation.  No facial droop.  No slurred speech.   Skin:  Skin is warm and dry.  No rash noted.  No pallor.    Psychiatric:  Normal mood and affect.  Behavior is normal.  Judgment and thought content normal.   Peripheral vascular:  No edema and strong pulses on all 4 extremities.  Genitourinary:  ---------------------------------------------------------------------------------------------------------------------        Invalid input(s): PROTCrCl cannot be calculated (Patient's most recent lab result is older than the maximum 30 days allowed.).  No results found for: AMMONIA          No results found for: HGBA1C  Lab Results   Component Value Date    TSH 1.610 05/21/2022     No results found for: PREGTESTUR, PREGSERUM, HCG, HCGQUANT  Pain Management Panel    There is no flowsheet data to display.       No results found for: BLOODCX  No results found for: URINECX  No results found for: WOUNDCX  No results found for: STOOLCX      ---------------------------------------------------------------------------------------------------------------------  Imaging Results (Last 7 Days)     ** No results found for the last 168 hours. **          I have personally reviewed the radiology images and read the final radiology report.  ---------------------------------------------------------------------------------------------------------------------  Assessment and Plan:  Proceed with EGD secondary to chronic reflux.  Proceed with colonoscopy secondary to chronic diarrhea and bright red blood per rectum.    Domitila Andersen MD  07/20/22  10:31 EDT

## 2022-07-20 NOTE — ANESTHESIA PREPROCEDURE EVALUATION
Anesthesia Evaluation     Patient summary reviewed and Nursing notes reviewed   no history of anesthetic complications:               Airway   Mallampati: II  TM distance: >3 FB  Neck ROM: full  No difficulty expected  Dental - normal exam     Pulmonary - normal exam   (+) asthma,shortness of breath, sleep apnea,   Cardiovascular - normal exam  Exercise tolerance: good (4-7 METS)    NYHA Classification: II    (+) hypertension,     ROS comment: · Normal left ventricular cavity size and wall thickness noted. All left ventricular wall segments contract normally  · Left ventricular ejection fraction appears to be 61 - 65%.  · Left ventricular diastolic function is consistent with (grade I) impaired relaxation.  · The aortic valve is structurally normal with no regurgitation or stenosis present.  · The mitral valve is structurally normal with no significant stenosis present. Mild mitral valve regurgitation is present.  · Mild tricuspid valve regurgitation is present. Estimated right ventricular systolic pressure from tricuspid regurgitation is normal (<35 mmHg).  · There is no evidence of pericardial effusion. .         Neuro/Psych  (+) headaches,    GI/Hepatic/Renal/Endo    (+) morbid obesity, GERD,  diabetes mellitus,     Musculoskeletal     Abdominal  - normal exam    Bowel sounds: normal.   Substance History - negative use     OB/GYN negative ob/gyn ROS         Other   arthritis,                      Anesthesia Plan    ASA 3     general     intravenous induction     Anesthetic plan, risks, benefits, and alternatives have been provided, discussed and informed consent has been obtained with: patient.        CODE STATUS:

## 2022-07-20 NOTE — ANESTHESIA POSTPROCEDURE EVALUATION
Patient: Lorenza David    Procedure Summary     Date: 07/20/22 Room / Location: [facility] OR  /  [facility] OR    Anesthesia Start: 1044 Anesthesia Stop: 1122    Procedures:       ESOPHAGOGASTRODUODENOSCOPY WITH BIOPSY (N/A Esophagus)      COLONOSCOPY (N/A ) Diagnosis:       Gastroesophageal reflux disease without esophagitis      Generalized abdominal pain      Diarrhea, unspecified type      (Gastroesophageal reflux disease without esophagitis [K21.9])      (Generalized abdominal pain [R10.84])      (Diarrhea, unspecified type [R19.7])    Surgeons: Domitila Andersen MD Provider: Chris Solano DO    Anesthesia Type: general ASA Status: 3          Anesthesia Type: general    Vitals  Vitals Value Taken Time   /82 07/20/22 1154   Temp 97 °F (36.1 °C) 07/20/22 1124   Pulse 71 07/20/22 1154   Resp 18 07/20/22 1154   SpO2 95 % 07/20/22 1154           Post Anesthesia Care and Evaluation    Patient location during evaluation: bedside  Patient participation: complete - patient participated  Level of consciousness: awake and alert  Pain score: 1  Pain management: adequate    Airway patency: patent  Anesthetic complications: No anesthetic complications  PONV Status: none  Cardiovascular status: acceptable  Respiratory status: acceptable  Hydration status: acceptable

## 2022-07-21 LAB — REF LAB TEST METHOD: NORMAL

## 2022-07-26 NOTE — PROGRESS NOTES
Recently, you underwent upper endoscopy at which time biopsies were taken of the esophagus.  You also had a large hiatal hernia.  I would like for you to take Protonix 40 mg twice daily on an empty stomach.  The biopsies revealed chronic esophagitis.  Biopsies of the stomach were negative for H. pylori gastritis.  A gastric polyp was biopsied and was found to be a benign fundic gland polyp.  One of the polyps removed from the colon was a serrated adenoma which is considered precancerous.  The other polyp was hyperplastic and has no cancer potential.  You will need repeat colonoscopy in 5 years due to personal history of colon polyps.  Biopsies of the rectum revealed acute inflammation with erosion likely due to diarrhea.  Random colon biopsies were negative for microscopic colitis.

## 2022-08-05 DIAGNOSIS — K57.92 DIVERTICULITIS: Primary | ICD-10-CM

## 2022-08-05 RX ORDER — CIPROFLOXACIN 500 MG/1
500 TABLET, FILM COATED ORAL 2 TIMES DAILY
Qty: 28 TABLET | Refills: 0 | Status: SHIPPED | OUTPATIENT
Start: 2022-08-05 | End: 2022-08-15

## 2022-08-05 RX ORDER — METRONIDAZOLE 500 MG/1
500 TABLET ORAL 3 TIMES DAILY
Qty: 30 TABLET | Refills: 0 | Status: SHIPPED | OUTPATIENT
Start: 2022-08-05 | End: 2022-08-15

## 2022-08-15 ENCOUNTER — OFFICE VISIT (OUTPATIENT)
Dept: GASTROENTEROLOGY | Facility: CLINIC | Age: 72
End: 2022-08-15

## 2022-08-15 VITALS — BODY MASS INDEX: 39.25 KG/M2 | HEIGHT: 65 IN | WEIGHT: 235.6 LBS

## 2022-08-15 DIAGNOSIS — K57.92 DIVERTICULITIS: Primary | ICD-10-CM

## 2022-08-15 DIAGNOSIS — K21.9 GASTROESOPHAGEAL REFLUX DISEASE WITHOUT ESOPHAGITIS: ICD-10-CM

## 2022-08-15 PROCEDURE — 99213 OFFICE O/P EST LOW 20 MIN: CPT | Performed by: PHYSICIAN ASSISTANT

## 2022-08-15 RX ORDER — LANSOPRAZOLE 30 MG/1
30 CAPSULE, DELAYED RELEASE ORAL 2 TIMES DAILY
Qty: 60 CAPSULE | Refills: 11 | Status: SHIPPED | OUTPATIENT
Start: 2022-08-15

## 2022-08-15 NOTE — PROGRESS NOTES
Chief Complaint   Patient presents with   • Medication Reaction       Lorenza David is a 71 y.o. female who presents to the office today for evaluation of Medication Reaction  .    HPI  Patient presents the clinic today for follow-up of medication reaction.  She underwent an EGD/colonoscopy with Dr. Tesfaye on 7/20.  On 8/5 she called into the clinic stating she was having severe left lower quadrant pain.  Upon reviewing colonoscopy results diverticuli were noted.  Patient was started on a 10-day course of Cipro and Flagyl which she tolerated well for roughly 1 week.  She ended up stopping the antibiotics about 3 days prior to completion due to a rash that presented on her arms, torso and legs.  After stopping the antibiotic the rash did not seem to heal therefore she was seen in the urgent care and given a steroid pack.  She has noted some improvement since starting that.  She is also been taking Benadryl daily which does help prevent some of the itching from occurring.  EGD results:  - The examined esophagus was normal. Biopsies were taken with a cold forceps for histology.  - A large hiatal hernia was present.  - Patchy mildly erythematous mucosa without bleeding was found in the gastric antrum. Biopsies were taken with a cold  forceps for histology.  - The examined duodenum was normal.  - A few 5 mm sessile polyps with no stigmata of recent bleeding were found in the gastric body. The polyp was removed  with a cold biopsy forceps. Resection and retrieval were complete.  Colonoscopy results:  - The perianal and digital rectal examinations were normal.  - A 7 mm polyp was found in the transverse colon. The polyp was sessile. The polyp was removed with a cold snare.  Resection and retrieval were complete.  - A 5 mm polyp was found in the sigmoid colon. The polyp was sessile. The polyp was removed with a cold snare.  Resection and retrieval were complete.  - The colon (entire examined portion) appeared normal. Biopsies  for histology were taken with a cold forceps for  evaluation of microscopic colitis.  - A single non-bleeding erosion was found at the ano-rectal verge in the rectum. Biopsies were taken with a cold forceps  for histology.  - Internal hemorrhoids were found during retroflexion. The hemorrhoids were Grade I (internal hemorrhoids that do not  prolapse).  - A few small-mouthed diverticula were found in the sigmoid colon and descending colon.  Review of Systems   Constitutional: Negative.    HENT: Negative.    Eyes: Negative.    Respiratory: Negative.    Cardiovascular: Negative.    Gastrointestinal: Negative for abdominal distention, abdominal pain, anal bleeding, blood in stool, constipation, diarrhea, nausea and vomiting.   Endocrine: Negative.    Genitourinary: Negative.    Musculoskeletal: Negative.    Skin: Negative.    Allergic/Immunologic: Negative.    Neurological: Negative.    Hematological: Negative.    Psychiatric/Behavioral: Negative.        ACTIVE PROBLEMS:   Specialty Problems        Gastroenterology Problems    Gastroesophageal reflux disease without esophagitis              PAST MEDICAL HISTORY:  Past Medical History:   Diagnosis Date   • Anemia    • Arthritis    • Asthma    • Colon polyp    • Diabetes mellitus (HCC)    • Fibromyalgia    • GERD (gastroesophageal reflux disease)    • Hernia    • Hypertension    • IBS (irritable bowel syndrome)    • Migraine    • Sleep apnea        SURGICAL HISTORY:  Past Surgical History:   Procedure Laterality Date   •  SECTION     • CHOLECYSTECTOMY     • COLONOSCOPY     • COLONOSCOPY N/A 2022    Procedure: COLONOSCOPY;  Surgeon: Domitila Andersen MD;  Location: [facility];  Service: Gastroenterology;  Laterality: N/A;   • DILATATION AND CURETTAGE     • ENDOSCOPY N/A 2022    Procedure: ESOPHAGOGASTRODUODENOSCOPY WITH BIOPSY;  Surgeon: Domitila Andersen MD;  Location: [facility] OR;  Service: Gastroenterology;  Laterality: N/A;   • EYE  SURGERY     • HYSTERECTOMY         FAMILY HISTORY:  History reviewed. No pertinent family history.    SOCIAL HISTORY:  Social History     Tobacco Use   • Smoking status: Never Smoker   • Smokeless tobacco: Never Used   Substance Use Topics   • Alcohol use: No       CURRENT MEDICATION:    Current Outpatient Medications:   •  amLODIPine (NORVASC) 5 MG tablet, Take 5 mg by mouth Daily., Disp: , Rfl:   •  aspirin 81 MG chewable tablet, Chew 81 mg Daily., Disp: , Rfl:   •  colestipol (COLESTID) 1 g tablet, Take 1 tablet by mouth Daily., Disp: 30 tablet, Rfl: 2  •  DULoxetine (CYMBALTA) 20 MG capsule, Take 20 mg by mouth Daily., Disp: , Rfl:   •  escitalopram (LEXAPRO) 20 MG tablet, Take 30 mg by mouth Daily., Disp: , Rfl:   •  fluticasone (FLONASE) 50 MCG/ACT nasal spray, 2 sprays into the nostril(s) as directed by provider Daily. Administer 2 sprays in each nostril for each dose., Disp: 16 g, Rfl: 6  •  HYDROcodone-acetaminophen (NORCO) 7.5-325 MG per tablet, Take 1 tablet by mouth Every 8 (Eight) Hours As Needed (pain)., Disp: 9 tablet, Rfl: 0  •  lansoprazole (PREVACID) 30 MG capsule, Take 1 capsule by mouth 2 (Two) Times a Day. Take one capsule 30 minutes prior to eating breakfast, Disp: 60 capsule, Rfl: 11  •  metFORMIN (GLUCOPHAGE) 500 MG tablet, Take 500 mg by mouth 2 (Two) Times a Day With Meals., Disp: , Rfl:   •  nystatin (MYCOSTATIN) 522266 UNIT/GM powder, Apply  topically 2 (Two) Times a Day As Needed., Disp: , Rfl:   •  predniSONE (DELTASONE) 5 MG tablet, Take 5 mg by mouth Daily., Disp: , Rfl:   •  pregabalin (LYRICA) 100 MG capsule, Take 300 mg by mouth 2 (Two) Times a Day., Disp: , Rfl:   •  Tocilizumab 162 MG/0.9ML solution prefilled syringe, Inject 162 mg under the skin 1 (One) Time Per Week., Disp: , Rfl:   •  vitamin D (ERGOCALCIFEROL) 1.25 MG (77218 UT) capsule capsule, Take 50,000 Units by mouth 1 (One) Time Per Week., Disp: , Rfl:     ALLERGIES:  Sulfa antibiotics, Ciprofloxacin, and  "Metronidazole    VISIT VITALS:  Ht 165.7 cm (65.25\")   Wt 107 kg (235 lb 9.6 oz)   BMI 38.91 kg/m²   Physical Exam  Constitutional:       General: She is not in acute distress.     Appearance: Normal appearance. She is well-developed.   HENT:      Head: Normocephalic and atraumatic.   Eyes:      Pupils: Pupils are equal, round, and reactive to light.   Cardiovascular:      Rate and Rhythm: Normal rate and regular rhythm.      Heart sounds: Normal heart sounds.   Pulmonary:      Effort: Pulmonary effort is normal. No respiratory distress.      Breath sounds: Normal breath sounds. No wheezing, rhonchi or rales.   Abdominal:      General: Abdomen is flat. Bowel sounds are normal. There is no distension.      Palpations: Abdomen is soft. There is no mass.      Tenderness: There is no abdominal tenderness. There is no guarding or rebound.      Hernia: No hernia is present.   Musculoskeletal:         General: No swelling. Normal range of motion.      Cervical back: Normal range of motion and neck supple.      Right lower leg: No edema.      Left lower leg: No edema.   Skin:     General: Skin is warm and dry.   Neurological:      Mental Status: She is alert and oriented to person, place, and time.   Psychiatric:         Attention and Perception: Attention normal.         Mood and Affect: Mood normal.         Speech: Speech normal.         Behavior: Behavior normal. Behavior is cooperative.         Thought Content: Thought content normal.         Assessment    Patient's EGD/colonoscopy results were reviewed with her-voiced understanding of these results.  Patient was told to stop the Cipro and Flagyl regimen if she continues to have a rash.  She stopped the medication on Friday and is still continuing to take Benadryl but the full body rash is improving.  Symptoms of diverticulitis have also improved on what antibiotics she did complete.  She is requesting refill on lansoprazole as well.   Diagnosis Plan   1. " Diverticulitis     2. Gastroesophageal reflux disease without esophagitis  lansoprazole (PREVACID) 30 MG capsule       Return in about 1 year (around 8/15/2023).                   This document has been electronically signed by Ruddy Red PA-C  August 25, 2022 09:51 EDT    Part of this note may be an electronic transcription/translation of spoken language to printed text using the Dragon Dictation System.   No

## 2022-08-31 ENCOUNTER — TELEPHONE (OUTPATIENT)
Dept: GASTROENTEROLOGY | Facility: CLINIC | Age: 72
End: 2022-08-31

## 2022-09-01 ENCOUNTER — TELEPHONE (OUTPATIENT)
Dept: GASTROENTEROLOGY | Facility: CLINIC | Age: 72
End: 2022-09-01

## 2022-09-01 NOTE — TELEPHONE ENCOUNTER
Patient requesting a call back, stated symptoms have returned and not sure what she should do, since last medication gave her hives.

## 2022-09-02 ENCOUNTER — TELEPHONE (OUTPATIENT)
Dept: GASTROENTEROLOGY | Facility: CLINIC | Age: 72
End: 2022-09-02

## 2022-09-02 NOTE — TELEPHONE ENCOUNTER
Patient called into office today to try to find out about getting a new antibotic called in, the others we had called in she was allergic to, I spoke with Ruddy per phone and she states to call in Augmentin 875mg BID x 14 days, I called [facility] in [place] and called Augmentin 875mg BID x 14 days into pharmacy

## 2022-09-20 ENCOUNTER — OFFICE VISIT (OUTPATIENT)
Dept: GASTROENTEROLOGY | Facility: CLINIC | Age: 72
End: 2022-09-20

## 2022-09-20 VITALS — BODY MASS INDEX: 36.96 KG/M2 | WEIGHT: 230 LBS | HEIGHT: 66 IN

## 2022-09-20 DIAGNOSIS — R10.84 GENERALIZED ABDOMINAL PAIN: Primary | ICD-10-CM

## 2022-09-20 DIAGNOSIS — K57.92 DIVERTICULITIS: ICD-10-CM

## 2022-09-20 DIAGNOSIS — K21.9 GASTROESOPHAGEAL REFLUX DISEASE WITHOUT ESOPHAGITIS: ICD-10-CM

## 2022-09-20 PROCEDURE — 99213 OFFICE O/P EST LOW 20 MIN: CPT | Performed by: PHYSICIAN ASSISTANT

## 2022-09-20 RX ORDER — DICYCLOMINE HYDROCHLORIDE 10 MG/1
10 CAPSULE ORAL
Qty: 120 CAPSULE | Refills: 0 | Status: SHIPPED | OUTPATIENT
Start: 2022-09-20 | End: 2022-11-14 | Stop reason: SDUPTHER

## 2022-09-20 NOTE — PROGRESS NOTES
Chief Complaint   Patient presents with   • Abdominal Pain       Lorenza David is a 72 y.o. female who presents to the office today for evaluation of Abdominal Pain  .    HPI     Lorenza David is here today with complaints of abdominal pain.    She reports she has abdominal pain. She reports she has not finished her Augmentin yet. She mentions after she goes to the bathroom she has abdominal pain for hours. She reports when she is on antibiotics she feels better until she completes the antibiotic. She mentions she had a ultra sound of her pancreas 04/2022  and they found some nodules. She reports she is still having diarrhea. She admits she has tried Bentyl years ago. She questions if she should still take the muscle relaxer that she was prescribed. She reports when she woke up today she was cramping bad. She reports she gave some stool samples in 11/2021. She is eating mashed potatoes, apple sauce, chicken broth and red grapes. She is unable to take cipro and flagyl due to a rash reaction.  She is also unable to take sulfa antibiotics due to a reaction.       Review of Systems   Constitutional: Negative.    HENT: Negative.    Eyes: Negative.    Respiratory: Negative.    Cardiovascular: Negative.    Gastrointestinal: Positive for abdominal pain, diarrhea and nausea. Negative for abdominal distention, anal bleeding, blood in stool, constipation and vomiting.   Endocrine: Negative.    Genitourinary: Negative.    Musculoskeletal: Negative.    Skin: Negative.    Allergic/Immunologic: Negative.    Neurological: Negative.    Hematological: Negative.    Psychiatric/Behavioral: Negative.        ACTIVE PROBLEMS:   Specialty Problems        Gastroenterology Problems    Gastroesophageal reflux disease without esophagitis              PAST MEDICAL HISTORY:  Past Medical History:   Diagnosis Date   • Anemia    • Arthritis    • Asthma    • Colon polyp    • Diabetes mellitus (HCC)    • Fibromyalgia    • GERD (gastroesophageal reflux  disease)    • Hernia    • Hypertension    • IBS (irritable bowel syndrome)    • Migraine    • Sleep apnea        SURGICAL HISTORY:  Past Surgical History:   Procedure Laterality Date   •  SECTION     • CHOLECYSTECTOMY     • COLONOSCOPY     • COLONOSCOPY N/A 2022    Procedure: COLONOSCOPY;  Surgeon: Domitila Andersen MD;  Location: [facility] OR;  Service: Gastroenterology;  Laterality: N/A;   • DILATATION AND CURETTAGE     • ENDOSCOPY N/A 2022    Procedure: ESOPHAGOGASTRODUODENOSCOPY WITH BIOPSY;  Surgeon: Domitila Andersen MD;  Location: [facility] OR;  Service: Gastroenterology;  Laterality: N/A;   • EYE SURGERY     • HYSTERECTOMY         FAMILY HISTORY:  History reviewed. No pertinent family history.    SOCIAL HISTORY:  Social History     Tobacco Use   • Smoking status: Never Smoker   • Smokeless tobacco: Never Used   Substance Use Topics   • Alcohol use: No       CURRENT MEDICATION:    Current Outpatient Medications:   •  amLODIPine (NORVASC) 5 MG tablet, Take 5 mg by mouth Daily., Disp: , Rfl:   •  aspirin 81 MG chewable tablet, Chew 81 mg Daily., Disp: , Rfl:   •  colestipol (COLESTID) 1 g tablet, Take 1 tablet by mouth Daily., Disp: 30 tablet, Rfl: 2  •  DULoxetine (CYMBALTA) 20 MG capsule, Take 20 mg by mouth Daily., Disp: , Rfl:   •  escitalopram (LEXAPRO) 20 MG tablet, Take 30 mg by mouth Daily., Disp: , Rfl:   •  fluticasone (FLONASE) 50 MCG/ACT nasal spray, 2 sprays into the nostril(s) as directed by provider Daily. Administer 2 sprays in each nostril for each dose., Disp: 16 g, Rfl: 6  •  HYDROcodone-acetaminophen (NORCO) 7.5-325 MG per tablet, Take 1 tablet by mouth Every 8 (Eight) Hours As Needed (pain)., Disp: 9 tablet, Rfl: 0  •  lansoprazole (PREVACID) 30 MG capsule, Take 1 capsule by mouth 2 (Two) Times a Day. Take one capsule 30 minutes prior to eating breakfast, Disp: 60 capsule, Rfl: 11  •  metFORMIN (GLUCOPHAGE) 500 MG tablet, Take 500 mg by mouth 2 (Two) Times a  "Day With Meals., Disp: , Rfl:   •  nystatin (MYCOSTATIN) 808254 UNIT/GM powder, Apply  topically 2 (Two) Times a Day As Needed., Disp: , Rfl:   •  predniSONE (DELTASONE) 5 MG tablet, Take 5 mg by mouth Daily., Disp: , Rfl:   •  pregabalin (LYRICA) 100 MG capsule, Take 300 mg by mouth 2 (Two) Times a Day., Disp: , Rfl:   •  Tocilizumab 162 MG/0.9ML solution prefilled syringe, Inject 162 mg under the skin 1 (One) Time Per Week., Disp: , Rfl:   •  vitamin D (ERGOCALCIFEROL) 1.25 MG (66030 UT) capsule capsule, Take 50,000 Units by mouth 1 (One) Time Per Week., Disp: , Rfl:   •  dicyclomine (Bentyl) 10 MG capsule, Take 1 capsule by mouth 4 (Four) Times a Day Before Meals & at Bedtime., Disp: 120 capsule, Rfl: 0    ALLERGIES:  Sulfa antibiotics, Ciprofloxacin, and Metronidazole    VISIT VITALS:  Ht 166.4 cm (65.5\")   Wt 104 kg (230 lb)   BMI 37.69 kg/m²   Physical Exam  Constitutional:       General: She is not in acute distress.     Appearance: Normal appearance. She is well-developed.   HENT:      Head: Normocephalic and atraumatic.   Eyes:      Pupils: Pupils are equal, round, and reactive to light.   Cardiovascular:      Rate and Rhythm: Normal rate and regular rhythm.      Heart sounds: Normal heart sounds.   Pulmonary:      Effort: Pulmonary effort is normal. No respiratory distress.      Breath sounds: Normal breath sounds. No wheezing, rhonchi or rales.   Abdominal:      General: Abdomen is flat. Bowel sounds are normal. There is no distension.      Palpations: Abdomen is soft. There is no mass.      Tenderness: There is no abdominal tenderness. There is no guarding or rebound.      Hernia: No hernia is present.   Musculoskeletal:         General: No swelling. Normal range of motion.      Cervical back: Normal range of motion and neck supple.      Right lower leg: No edema.      Left lower leg: No edema.   Skin:     General: Skin is warm and dry.   Neurological:      Mental Status: She is alert and oriented to " person, place, and time.   Psychiatric:         Attention and Perception: Attention normal.         Mood and Affect: Mood normal.         Speech: Speech normal.         Behavior: Behavior normal. Behavior is cooperative.         Thought Content: Thought content normal.         Assessment       Diagnosis Plan   1. Generalized abdominal pain  CT Abdomen Pelvis With & Without Contrast    dicyclomine (Bentyl) 10 MG capsule   2. Gastroesophageal reflux disease without esophagitis     3. Diverticulitis  CT Abdomen Pelvis With & Without Contrast    dicyclomine (Bentyl) 10 MG capsule       Return in about 1 year (around 9/20/2023).         Diverticulitis  I have ordered CT Abdomen Pelvis With & Without Contrast. I have sent Bentyl to her pharmacy. She is to take every 3 to 4 hours. She was instructed to eat a bland diet to help with abdominal pain. She was urged to go to the emergency room if the pain is to severe.          This document has been electronically signed by Ruddy Red PA-C  September 29, 2022 08:53 EDT    Part of this note may be an electronic transcription/translation of spoken language to printed text using the Dragon Dictation System.          Transcribed from ambient dictation for Ruddy eRd PA-C by IAN PASTOR.  09/21/22   11:37 EDT    Patient verbalized consent to the visit recording.

## 2022-10-21 ENCOUNTER — APPOINTMENT (OUTPATIENT)
Dept: CT IMAGING | Facility: HOSPITAL | Age: 72
End: 2022-10-21

## 2022-11-13 ENCOUNTER — HOSPITAL ENCOUNTER (OUTPATIENT)
Dept: CT IMAGING | Facility: HOSPITAL | Age: 72
Discharge: HOME | End: 2022-11-13
Admitting: PHYSICIAN ASSISTANT

## 2022-11-13 DIAGNOSIS — R10.84 GENERALIZED ABDOMINAL PAIN: ICD-10-CM

## 2022-11-13 DIAGNOSIS — K57.92 DIVERTICULITIS: ICD-10-CM

## 2022-11-13 LAB — CREAT BLDA-MCNC: 0.9 MG/DL (ref 0.6–1.3)

## 2022-11-13 PROCEDURE — 25010000002 IOPAMIDOL 61 % SOLUTION: Performed by: PHYSICIAN ASSISTANT

## 2022-11-13 PROCEDURE — 74178 CT ABD&PLV WO CNTR FLWD CNTR: CPT

## 2022-11-13 PROCEDURE — 74178 CT ABD&PLV WO CNTR FLWD CNTR: CPT | Performed by: RADIOLOGY

## 2022-11-13 PROCEDURE — 82565 ASSAY OF CREATININE: CPT

## 2022-11-13 RX ADMIN — IOPAMIDOL 85 ML: 612 INJECTION, SOLUTION INTRAVENOUS at 16:10

## 2022-11-14 DIAGNOSIS — K57.92 DIVERTICULITIS: ICD-10-CM

## 2022-11-14 DIAGNOSIS — R10.84 GENERALIZED ABDOMINAL PAIN: ICD-10-CM

## 2022-11-14 RX ORDER — DICYCLOMINE HYDROCHLORIDE 10 MG/1
10 CAPSULE ORAL
Qty: 120 CAPSULE | Refills: 0 | Status: SHIPPED | OUTPATIENT
Start: 2022-11-14

## 2022-11-29 ENCOUNTER — APPOINTMENT (OUTPATIENT)
Dept: CT IMAGING | Facility: HOSPITAL | Age: 72
End: 2022-11-29

## 2022-12-02 ENCOUNTER — TELEPHONE (OUTPATIENT)
Dept: GASTROENTEROLOGY | Facility: CLINIC | Age: 72
End: 2022-12-02

## 2022-12-15 ENCOUNTER — TELEPHONE (OUTPATIENT)
Dept: GASTROENTEROLOGY | Facility: CLINIC | Age: 72
End: 2022-12-15

## 2022-12-16 NOTE — TELEPHONE ENCOUNTER
Patient checking status of request for refill on cipro. Let patient know that provider is not in the office today.

## 2022-12-20 NOTE — TELEPHONE ENCOUNTER
Called patient no answer, left message on voice mail to call the office and let us know if she is allergic to cipro.

## 2022-12-29 ENCOUNTER — TELEPHONE (OUTPATIENT)
Dept: GASTROENTEROLOGY | Facility: CLINIC | Age: 72
End: 2022-12-29

## 2022-12-29 ENCOUNTER — TELEPHONE (OUTPATIENT)
Dept: UROLOGY | Facility: CLINIC | Age: 72
End: 2022-12-29

## 2022-12-30 DIAGNOSIS — K57.92 DIVERTICULITIS: Primary | ICD-10-CM

## 2022-12-30 RX ORDER — AMOXICILLIN AND CLAVULANATE POTASSIUM 875; 125 MG/1; MG/1
1 TABLET, FILM COATED ORAL 2 TIMES DAILY
Qty: 20 TABLET | Refills: 0 | Status: SHIPPED | OUTPATIENT
Start: 2022-12-30 | End: 2023-01-09

## 2023-01-11 ENCOUNTER — TRANSCRIBE ORDERS (OUTPATIENT)
Dept: ADMINISTRATIVE | Facility: HOSPITAL | Age: 73
End: 2023-01-11
Payer: MEDICARE

## 2023-01-11 DIAGNOSIS — R10.84 ABDOMINAL PAIN, GENERALIZED: Primary | ICD-10-CM

## 2023-01-25 ENCOUNTER — TELEPHONE (OUTPATIENT)
Dept: UROLOGY | Facility: CLINIC | Age: 73
End: 2023-01-25
Payer: MEDICARE

## 2023-01-25 NOTE — TELEPHONE ENCOUNTER
Pt is having severe abdominal pain when going having a bowel movement.  She would to be called back with advice on what she can do to stop the pain.

## 2023-02-03 NOTE — TELEPHONE ENCOUNTER
She said that it is every bm but no blood. She said that she does belive it is diverticulitis pain

## 2023-02-08 DIAGNOSIS — K57.92 DIVERTICULITIS: Primary | ICD-10-CM

## 2023-02-08 RX ORDER — AMOXICILLIN AND CLAVULANATE POTASSIUM 875; 125 MG/1; MG/1
1 TABLET, FILM COATED ORAL 2 TIMES DAILY
Qty: 20 TABLET | Refills: 0 | Status: SHIPPED | OUTPATIENT
Start: 2023-02-08 | End: 2023-02-18

## 2023-02-09 NOTE — TELEPHONE ENCOUNTER
Called patient no answer, I left a message on her voicemail that Augmentin was sent to her pharmacy and to follow a no seeds, nuts, or berry diet, emphasized this is very important.

## 2023-02-13 ENCOUNTER — HOSPITAL ENCOUNTER (OUTPATIENT)
Dept: CT IMAGING | Facility: HOSPITAL | Age: 73
Discharge: HOME | End: 2023-02-13
Admitting: INTERNAL MEDICINE
Payer: MEDICARE

## 2023-02-13 DIAGNOSIS — R10.84 ABDOMINAL PAIN, GENERALIZED: ICD-10-CM

## 2023-02-13 PROCEDURE — 74176 CT ABD & PELVIS W/O CONTRAST: CPT | Performed by: RADIOLOGY

## 2023-02-13 PROCEDURE — 74176 CT ABD & PELVIS W/O CONTRAST: CPT

## 2023-03-27 ENCOUNTER — APPOINTMENT (OUTPATIENT)
Dept: CT IMAGING | Facility: HOSPITAL | Age: 73
End: 2023-03-27
Payer: MEDICARE

## 2023-03-27 ENCOUNTER — HOSPITAL ENCOUNTER (EMERGENCY)
Facility: HOSPITAL | Age: 73
Discharge: HOME | End: 2023-03-27
Attending: STUDENT IN AN ORGANIZED HEALTH CARE EDUCATION/TRAINING PROGRAM
Payer: MEDICARE

## 2023-03-27 VITALS
RESPIRATION RATE: 18 BRPM | HEART RATE: 71 BPM | DIASTOLIC BLOOD PRESSURE: 76 MMHG | BODY MASS INDEX: 36.65 KG/M2 | TEMPERATURE: 96.9 F | HEIGHT: 65 IN | WEIGHT: 220 LBS | SYSTOLIC BLOOD PRESSURE: 126 MMHG | OXYGEN SATURATION: 96 %

## 2023-03-27 DIAGNOSIS — R30.0 DYSURIA: Primary | ICD-10-CM

## 2023-03-27 LAB
ALBUMIN SERPL-MCNC: 4.1 G/DL (ref 3.5–5.2)
ALBUMIN/GLOB SERPL: 1.2 G/DL
ALP SERPL-CCNC: 87 U/L (ref 39–117)
ALT SERPL W P-5'-P-CCNC: 15 U/L (ref 1–33)
ANION GAP SERPL CALCULATED.3IONS-SCNC: 8.9 MMOL/L (ref 5–15)
AST SERPL-CCNC: 19 U/L (ref 1–32)
BACTERIA UR QL AUTO: ABNORMAL /HPF
BASOPHILS # BLD AUTO: 0.09 10*3/MM3 (ref 0–0.2)
BASOPHILS NFR BLD AUTO: 1.2 % (ref 0–1.5)
BILIRUB SERPL-MCNC: 0.3 MG/DL (ref 0–1.2)
BILIRUB UR QL STRIP: NEGATIVE
BUN SERPL-MCNC: 8 MG/DL (ref 8–23)
BUN/CREAT SERPL: 8.7 (ref 7–25)
CALCIUM SPEC-SCNC: 9.7 MG/DL (ref 8.6–10.5)
CHLORIDE SERPL-SCNC: 99 MMOL/L (ref 98–107)
CLARITY UR: CLEAR
CO2 SERPL-SCNC: 27.1 MMOL/L (ref 22–29)
COLOR UR: ABNORMAL
CREAT SERPL-MCNC: 0.92 MG/DL (ref 0.57–1)
CRP SERPL-MCNC: <0.3 MG/DL (ref 0–0.5)
DEPRECATED RDW RBC AUTO: 48.6 FL (ref 37–54)
EGFRCR SERPLBLD CKD-EPI 2021: 67.1 ML/MIN/1.73
EOSINOPHIL # BLD AUTO: 0.2 10*3/MM3 (ref 0–0.4)
EOSINOPHIL NFR BLD AUTO: 2.6 % (ref 0.3–6.2)
ERYTHROCYTE [DISTWIDTH] IN BLOOD BY AUTOMATED COUNT: 15.3 % (ref 12.3–15.4)
GLOBULIN UR ELPH-MCNC: 3.3 GM/DL
GLUCOSE SERPL-MCNC: 106 MG/DL (ref 65–99)
GLUCOSE UR STRIP-MCNC: NEGATIVE MG/DL
HCT VFR BLD AUTO: 41.7 % (ref 34–46.6)
HGB BLD-MCNC: 12.9 G/DL (ref 12–15.9)
HGB UR QL STRIP.AUTO: NEGATIVE
HOLD SPECIMEN: NORMAL
HOLD SPECIMEN: NORMAL
HYALINE CASTS UR QL AUTO: ABNORMAL /LPF
IMM GRANULOCYTES # BLD AUTO: 0.02 10*3/MM3 (ref 0–0.05)
IMM GRANULOCYTES NFR BLD AUTO: 0.3 % (ref 0–0.5)
KETONES UR QL STRIP: NEGATIVE
LEUKOCYTE ESTERASE UR QL STRIP.AUTO: ABNORMAL
LIPASE SERPL-CCNC: 107 U/L (ref 13–60)
LYMPHOCYTES # BLD AUTO: 1.64 10*3/MM3 (ref 0.7–3.1)
LYMPHOCYTES NFR BLD AUTO: 21.5 % (ref 19.6–45.3)
MCH RBC QN AUTO: 26.9 PG (ref 26.6–33)
MCHC RBC AUTO-ENTMCNC: 30.9 G/DL (ref 31.5–35.7)
MCV RBC AUTO: 86.9 FL (ref 79–97)
MONOCYTES # BLD AUTO: 0.53 10*3/MM3 (ref 0.1–0.9)
MONOCYTES NFR BLD AUTO: 6.9 % (ref 5–12)
NEUTROPHILS NFR BLD AUTO: 5.16 10*3/MM3 (ref 1.7–7)
NEUTROPHILS NFR BLD AUTO: 67.5 % (ref 42.7–76)
NITRITE UR QL STRIP: NEGATIVE
NRBC BLD AUTO-RTO: 0 /100 WBC (ref 0–0.2)
PH UR STRIP.AUTO: 6.5 [PH] (ref 5–8)
PLATELET # BLD AUTO: 339 10*3/MM3 (ref 140–450)
PMV BLD AUTO: 9.1 FL (ref 6–12)
POTASSIUM SERPL-SCNC: 4.9 MMOL/L (ref 3.5–5.2)
PROT SERPL-MCNC: 7.4 G/DL (ref 6–8.5)
PROT UR QL STRIP: NEGATIVE
RBC # BLD AUTO: 4.8 10*6/MM3 (ref 3.77–5.28)
RBC # UR STRIP: ABNORMAL /HPF
REF LAB TEST METHOD: ABNORMAL
SODIUM SERPL-SCNC: 135 MMOL/L (ref 136–145)
SP GR UR STRIP: 1.01 (ref 1–1.03)
SQUAMOUS #/AREA URNS HPF: ABNORMAL /HPF
UROBILINOGEN UR QL STRIP: ABNORMAL
WBC # UR STRIP: ABNORMAL /HPF
WBC NRBC COR # BLD: 7.64 10*3/MM3 (ref 3.4–10.8)
WHOLE BLOOD HOLD COAG: NORMAL
WHOLE BLOOD HOLD SPECIMEN: NORMAL

## 2023-03-27 PROCEDURE — 74176 CT ABD & PELVIS W/O CONTRAST: CPT

## 2023-03-27 PROCEDURE — 36415 COLL VENOUS BLD VENIPUNCTURE: CPT

## 2023-03-27 PROCEDURE — 81001 URINALYSIS AUTO W/SCOPE: CPT | Performed by: PHYSICIAN ASSISTANT

## 2023-03-27 PROCEDURE — 83690 ASSAY OF LIPASE: CPT | Performed by: PHYSICIAN ASSISTANT

## 2023-03-27 PROCEDURE — 85025 COMPLETE CBC W/AUTO DIFF WBC: CPT | Performed by: PHYSICIAN ASSISTANT

## 2023-03-27 PROCEDURE — 99283 EMERGENCY DEPT VISIT LOW MDM: CPT

## 2023-03-27 PROCEDURE — 80053 COMPREHEN METABOLIC PANEL: CPT | Performed by: PHYSICIAN ASSISTANT

## 2023-03-27 PROCEDURE — 86140 C-REACTIVE PROTEIN: CPT | Performed by: PHYSICIAN ASSISTANT

## 2023-03-27 RX ORDER — AMOXICILLIN AND CLAVULANATE POTASSIUM 875; 125 MG/1; MG/1
1 TABLET, FILM COATED ORAL 2 TIMES DAILY
Qty: 14 TABLET | Refills: 0 | Status: SHIPPED | OUTPATIENT
Start: 2023-03-27 | End: 2023-04-03

## 2023-03-27 NOTE — ED NOTES
MEDICAL SCREENING:    Reason for Visit: dysuria, urinary frequency     Patient initially seen in triage.  The patient was advised further evaluation and diagnostic testing will be needed, some of the treatment and testing will be initiated in the lobby in order to begin the process.  The patient will be returned to the waiting area for the time being and possibly be re-assessed by a subsequent ED provider.  The patient will be brought back to the treatment area in as timely manner as possible.       Niya Gonzalez PA  03/27/23 1552

## 2023-04-13 NOTE — ED PROVIDER NOTES
Subjective   History of Present Illness  Patient complains of bilateral flank pain.  Patient also is having some dark urine.  Patient has a past medical history of hypertension, fibromyalgia, irritable bowel, asthma, sleep apnea, GERD.    Dysuria  Pain quality:  Aching and burning  Pain severity:  Mild  Onset quality:  Sudden  Timing:  Constant  Progression:  Worsening  Chronicity:  New  Relieved by:  Nothing  Worsened by:  Nothing  Ineffective treatments:  None tried  Associated symptoms: flank pain        Review of Systems   Genitourinary: Positive for dysuria and flank pain.       Past Medical History:   Diagnosis Date   • Anemia    • Arthritis    • Asthma    • Colon polyp    • Diabetes mellitus    • Fibromyalgia    • GERD (gastroesophageal reflux disease)    • Hernia    • Hypertension    • IBS (irritable bowel syndrome)    • Migraine    • Sleep apnea        Allergies   Allergen Reactions   • Sulfa Antibiotics    • Ciprofloxacin Rash   • Metronidazole Rash       Past Surgical History:   Procedure Laterality Date   •  SECTION     • CHOLECYSTECTOMY     • COLONOSCOPY     • COLONOSCOPY N/A 2022    Procedure: COLONOSCOPY;  Surgeon: Domitila Andersen MD;  Location: [facility];  Service: Gastroenterology;  Laterality: N/A;   • DILATATION AND CURETTAGE     • ENDOSCOPY N/A 2022    Procedure: ESOPHAGOGASTRODUODENOSCOPY WITH BIOPSY;  Surgeon: Domitila Andersen MD;  Location: [facility];  Service: Gastroenterology;  Laterality: N/A;   • EYE SURGERY     • HYSTERECTOMY         No family history on file.    Social History     Socioeconomic History   • Marital status:    Tobacco Use   • Smoking status: Never   • Smokeless tobacco: Never   Vaping Use   • Vaping Use: Never used   Substance and Sexual Activity   • Alcohol use: No   • Drug use: No   • Sexual activity: Defer           Objective   Physical Exam  Vitals and nursing note reviewed.   Constitutional:       Appearance: She is  well-developed.   HENT:      Head: Normocephalic.   Cardiovascular:      Rate and Rhythm: Normal rate and regular rhythm.   Pulmonary:      Effort: Pulmonary effort is normal.      Breath sounds: Normal breath sounds.   Abdominal:      General: Bowel sounds are normal.      Palpations: Abdomen is soft.      Tenderness: There is no abdominal tenderness.   Musculoskeletal:         General: Normal range of motion.      Cervical back: Neck supple.   Skin:     General: Skin is warm and dry.   Neurological:      Mental Status: She is alert and oriented to person, place, and time.   Psychiatric:         Behavior: Behavior normal.         Thought Content: Thought content normal.         Judgment: Judgment normal.         Procedures           ED Course      Results for orders placed or performed during the hospital encounter of 03/27/23   Comprehensive Metabolic Panel    Specimen: Arm, Right; Blood   Result Value Ref Range    Glucose 106 (H) 65 - 99 mg/dL    BUN 8 8 - 23 mg/dL    Creatinine 0.92 0.57 - 1.00 mg/dL    Sodium 135 (L) 136 - 145 mmol/L    Potassium 4.9 3.5 - 5.2 mmol/L    Chloride 99 98 - 107 mmol/L    CO2 27.1 22.0 - 29.0 mmol/L    Calcium 9.7 8.6 - 10.5 mg/dL    Total Protein 7.4 6.0 - 8.5 g/dL    Albumin 4.1 3.5 - 5.2 g/dL    ALT (SGPT) 15 1 - 33 U/L    AST (SGOT) 19 1 - 32 U/L    Alkaline Phosphatase 87 39 - 117 U/L    Total Bilirubin 0.3 0.0 - 1.2 mg/dL    Globulin 3.3 gm/dL    A/G Ratio 1.2 g/dL    BUN/Creatinine Ratio 8.7 7.0 - 25.0    Anion Gap 8.9 5.0 - 15.0 mmol/L    eGFR 67.1 >60.0 mL/min/1.73   Urinalysis With Culture If Indicated - Urine, Clean Catch    Specimen: Urine, Clean Catch   Result Value Ref Range    Color, UA Dark Yellow (A) Yellow, Straw    Appearance, UA Clear Clear    pH, UA 6.5 5.0 - 8.0    Specific Gravity, UA 1.014 1.005 - 1.030    Glucose, UA Negative Negative    Ketones, UA Negative Negative    Bilirubin, UA Negative Negative    Blood, UA Negative Negative    Protein, UA Negative  Negative    Leuk Esterase, UA Trace (A) Negative    Nitrite, UA Negative Negative    Urobilinogen, UA 0.2 E.U./dL 0.2 - 1.0 E.U./dL   C-reactive Protein    Specimen: Arm, Right; Blood   Result Value Ref Range    C-Reactive Protein <0.30 0.00 - 0.50 mg/dL   Lipase    Specimen: Arm, Right; Blood   Result Value Ref Range    Lipase 107 (H) 13 - 60 U/L   CBC Auto Differential    Specimen: Arm, Right; Blood   Result Value Ref Range    WBC 7.64 3.40 - 10.80 10*3/mm3    RBC 4.80 3.77 - 5.28 10*6/mm3    Hemoglobin 12.9 12.0 - 15.9 g/dL    Hematocrit 41.7 34.0 - 46.6 %    MCV 86.9 79.0 - 97.0 fL    MCH 26.9 26.6 - 33.0 pg    MCHC 30.9 (L) 31.5 - 35.7 g/dL    RDW 15.3 12.3 - 15.4 %    RDW-SD 48.6 37.0 - 54.0 fl    MPV 9.1 6.0 - 12.0 fL    Platelets 339 140 - 450 10*3/mm3    Neutrophil % 67.5 42.7 - 76.0 %    Lymphocyte % 21.5 19.6 - 45.3 %    Monocyte % 6.9 5.0 - 12.0 %    Eosinophil % 2.6 0.3 - 6.2 %    Basophil % 1.2 0.0 - 1.5 %    Immature Grans % 0.3 0.0 - 0.5 %    Neutrophils, Absolute 5.16 1.70 - 7.00 10*3/mm3    Lymphocytes, Absolute 1.64 0.70 - 3.10 10*3/mm3    Monocytes, Absolute 0.53 0.10 - 0.90 10*3/mm3    Eosinophils, Absolute 0.20 0.00 - 0.40 10*3/mm3    Basophils, Absolute 0.09 0.00 - 0.20 10*3/mm3    Immature Grans, Absolute 0.02 0.00 - 0.05 10*3/mm3    nRBC 0.0 0.0 - 0.2 /100 WBC   Urinalysis, Microscopic Only - Urine, Clean Catch    Specimen: Urine, Clean Catch   Result Value Ref Range    RBC, UA 0-2 None Seen, 0-2 /HPF    WBC, UA 0-2 None Seen, 0-2 /HPF    Bacteria, UA None Seen None Seen /HPF    Squamous Epithelial Cells, UA 3-6 (A) None Seen, 0-2 /HPF    Hyaline Casts, UA None Seen None Seen /LPF    Methodology Automated Microscopy    Green Top (Gel)   Result Value Ref Range    Extra Tube Hold for add-ons.    Lavender Top   Result Value Ref Range    Extra Tube hold for add-on    Gold Top - SST   Result Value Ref Range    Extra Tube Hold for add-ons.    Light Blue Top   Result Value Ref Range    Extra Tube  Hold for add-ons.         CT Abdomen Pelvis Stone Protocol   Final Result      1.  No CT correlate for the patient's abdominal pain.   2.  No renal or ureteral calculus. No hydroureteronephrosis.   3.  Colonic diverticulosis without evidence of acute diverticulitis.   4.  Moderate size hiatal hernia.         Signer Name: Krunal Lepe MD    Signed: 3/27/2023 8:49 PM    Workstation Name: [email or web address]     [facility]                                      Medical Decision Making  Patient complains of bilateral flank pain.  Patient also is having some dark urine.  Patient has a past medical history of hypertension, fibromyalgia, irritable bowel, asthma, sleep apnea, GERD.      Dysuria: complicated acute illness or injury  Amount and/or Complexity of Data Reviewed  Labs: ordered. Decision-making details documented in ED Course.  Radiology: ordered. Decision-making details documented in ED Course.      Risk  Prescription drug management.    Risk Details: Joyce Clemons    Patient is stable.  Patient is medically cleared.  No EMC exist.  Patient is discharged home.  Patient's diagnostic results were discussed with him and they demonstrated understanding of diagnosis and treatment plan.  Patient was advised to return to the ER or follow-up with PCP if symptoms worsen or fail to improve as expected.        Final diagnoses:   Dysuria       ED Disposition  ED Disposition     ED Disposition   Discharge    Condition   Stable    Comment   --             Sabas Verde MD  [address] [facility]  [place] [facility] [address] [phone]    In 2 days           Medication List      ASK your doctor about these medications    amoxicillin-clavulanate 875-125 MG per tablet  Commonly known as: AUGMENTIN  Take 1 tablet by mouth 2 (Two) Times a Day for 7 days.  Ask about: Should I take this medication?           Where to Get Your Medications      These medications were sent to [id number] -  [place], [place] - [address] AT [facility] [address] - [phone] PH - [phone] FX  [address] [facility] [address]    Phone: [phone]   · amoxicillin-clavulanate 875-125 MG per tablet          Joyce Clemons PA  04/13/23 1011

## 2023-08-15 ENCOUNTER — OFFICE VISIT (OUTPATIENT)
Dept: GASTROENTEROLOGY | Facility: CLINIC | Age: 73
End: 2023-08-15
Payer: MEDICARE

## 2023-08-15 VITALS — HEIGHT: 65 IN | BODY MASS INDEX: 36.65 KG/M2 | WEIGHT: 220 LBS

## 2023-08-15 DIAGNOSIS — K21.9 GASTROESOPHAGEAL REFLUX DISEASE WITHOUT ESOPHAGITIS: ICD-10-CM

## 2023-08-15 DIAGNOSIS — K59.04 CHRONIC IDIOPATHIC CONSTIPATION: Primary | ICD-10-CM

## 2023-08-15 DIAGNOSIS — R10.84 GENERALIZED ABDOMINAL PAIN: ICD-10-CM

## 2023-08-15 RX ORDER — POLYETHYLENE GLYCOL 3350 17 G/17G
17 POWDER, FOR SOLUTION ORAL DAILY
Qty: 527 G | Refills: 11 | Status: SHIPPED | OUTPATIENT
Start: 2023-08-15

## 2023-08-15 RX ORDER — LANSOPRAZOLE 30 MG/1
30 CAPSULE, DELAYED RELEASE ORAL 2 TIMES DAILY
Qty: 180 CAPSULE | Refills: 3 | Status: SHIPPED | OUTPATIENT
Start: 2023-08-15

## 2023-08-15 NOTE — PROGRESS NOTES
DATE:  8/15/2023    DIAGNOSIS: GERD, constipation    CHIEF COMPLAINT:  Chief Complaint   Patient presents with    Abdominal Pain     HPI   Lorenza David is here today with complaints of abdominal pain.     She reports she has abdominal pain. She reports she has not finished her Augmentin yet. She mentions after she goes to the bathroom she has abdominal pain for hours. She reports when she is on antibiotics she feels better until she completes the antibiotic. She mentions she had a ultra sound of her pancreas 04/2022  and they found some nodules. She reports she is still having diarrhea. She admits she has tried Bentyl years ago. She questions if she should still take the muscle relaxer that she was prescribed. She reports when she woke up today she was cramping bad. She reports she gave some stool samples in 11/2021. She is eating mashed potatoes, apple sauce, chicken broth and red grapes. She is unable to take cipro and flagyl due to a rash reaction.  She is also unable to take sulfa antibiotics due to a reaction.       Interval History:  Patient states that she has been doing fairly well since she was last seen in clinic.  She continues to take lansoprazole 30 mg twice daily dosing which controls a large majority of her heartburn/reflux symptoms.  She rarely will have breakthrough unless triggered by certain foods.  Patient continues to have some left-sided abdominal pain with bowel movements however states it has significantly improved since her colonoscopy.  She does have issues with constipation however daily prune juice does seem to loosen bowels somewhat.  Bowel movements range from type 3-4 on the [place] stool scale.  She typically feels like she evacuates her colon well.  She has not had any recent diverticulitis flareups has been closely watching diet to avoid seeds nuts and berries.    The following portions of the patient's history were reviewed and updated as appropriate: allergies, current medications,  past family history, past medical history, past social history, past surgical history and problem list.  REVIEW OF SYSTEMS:   Review of Systems   Constitutional: Negative.    HENT: Negative.     Eyes: Negative.    Respiratory: Negative.     Cardiovascular: Negative.    Gastrointestinal:  Positive for abdominal pain, diarrhea and nausea. Negative for abdominal distention, anal bleeding, blood in stool, constipation and vomiting.   Endocrine: Negative.    Genitourinary: Negative.    Musculoskeletal: Negative.    Skin: Negative.    Allergic/Immunologic: Negative.    Neurological: Negative.    Hematological: Negative.    Psychiatric/Behavioral: Negative.       PAST MEDICAL HISTORY:  Past Medical History:   Diagnosis Date    Anemia     Arthritis     Asthma     Colon polyp     Diabetes mellitus     Fibromyalgia     GERD (gastroesophageal reflux disease)     Hernia     Hypertension     IBS (irritable bowel syndrome)     Migraine     Sleep apnea        PAST SURGICAL HISTORY:  Past Surgical History:   Procedure Laterality Date     SECTION      CHOLECYSTECTOMY      COLONOSCOPY      COLONOSCOPY N/A 2022    Procedure: COLONOSCOPY;  Surgeon: Domitila Andersen MD;  Location: [facility];  Service: Gastroenterology;  Laterality: N/A;    DILATATION AND CURETTAGE      ENDOSCOPY N/A 2022    Procedure: ESOPHAGOGASTRODUODENOSCOPY WITH BIOPSY;  Surgeon: Domitila Andersen MD;  Location: [facility];  Service: Gastroenterology;  Laterality: N/A;    EYE SURGERY      HYSTERECTOMY         SOCIAL HISTORY:  Social History     Socioeconomic History    Marital status:    Tobacco Use    Smoking status: Never    Smokeless tobacco: Never   Vaping Use    Vaping Use: Never used   Substance and Sexual Activity    Alcohol use: No    Drug use: No    Sexual activity: Defer       FAMILY HISTORY:  History reviewed. No pertinent family history.    MEDICATIONS:      Current Outpatient Medications:     amLODIPine  "(NORVASC) 5 MG tablet, Take 1 tablet by mouth Daily., Disp: , Rfl:     aspirin 81 MG chewable tablet, Chew 1 tablet Daily., Disp: , Rfl:     dicyclomine (Bentyl) 10 MG capsule, Take 1 capsule by mouth 4 (Four) Times a Day Before Meals & at Bedtime., Disp: 120 capsule, Rfl: 0    DULoxetine (CYMBALTA) 20 MG capsule, Take 1 capsule by mouth Daily., Disp: , Rfl:     escitalopram (LEXAPRO) 20 MG tablet, Take 1.5 tablets by mouth Daily., Disp: , Rfl:     fluticasone (FLONASE) 50 MCG/ACT nasal spray, 2 sprays into the nostril(s) as directed by provider Daily. Administer 2 sprays in each nostril for each dose., Disp: 16 g, Rfl: 6    HYDROcodone-acetaminophen (NORCO) 7.5-325 MG per tablet, Take 1 tablet by mouth Every 8 (Eight) Hours As Needed (pain)., Disp: 9 tablet, Rfl: 0    lansoprazole (PREVACID) 30 MG capsule, Take 1 capsule by mouth 2 (Two) Times a Day. Take one capsule 30 minutes prior to eating breakfast, Disp: 180 capsule, Rfl: 3    metFORMIN (GLUCOPHAGE) 500 MG tablet, Take 1 tablet by mouth 2 (Two) Times a Day With Meals., Disp: , Rfl:     nystatin (MYCOSTATIN) 031483 UNIT/GM powder, Apply  topically 2 (Two) Times a Day As Needed., Disp: , Rfl:     predniSONE (DELTASONE) 5 MG tablet, Take 1 tablet by mouth Daily., Disp: , Rfl:     pregabalin (LYRICA) 100 MG capsule, Take 3 capsules by mouth 2 (Two) Times a Day., Disp: , Rfl:     Tocilizumab 162 MG/0.9ML solution prefilled syringe, Inject 0.9 mL under the skin into the appropriate area as directed 1 (One) Time Per Week., Disp: , Rfl:     vitamin D (ERGOCALCIFEROL) 1.25 MG (97284 UT) capsule capsule, Take 1 capsule by mouth 1 (One) Time Per Week., Disp: , Rfl:     polyethylene glycol (MIRALAX) 17 GM/SCOOP powder, Take 17 g by mouth Daily., Disp: 527 g, Rfl: 11    ALLERGIES:    Allergies   Allergen Reactions    Sulfa Antibiotics     Ciprofloxacin Rash    Metronidazole Rash       VIST VITALS/PHYSICAL EXAM:  Ht 165.1 cm (65\")   Wt 99.8 kg (220 lb)   BMI 36.61 kg/mý "   Physical Exam  Constitutional:       General: She is not in acute distress.     Appearance: Normal appearance. She is well-developed.   HENT:      Head: Normocephalic and atraumatic.   Eyes:      Pupils: Pupils are equal, round, and reactive to light.   Pulmonary:      Effort: Pulmonary effort is normal. No respiratory distress.   Abdominal:      General: Abdomen is flat. There is no distension.      Palpations: Abdomen is soft. There is no mass.      Tenderness: There is no abdominal tenderness. There is no guarding or rebound.      Hernia: No hernia is present.   Musculoskeletal:         General: No swelling. Normal range of motion.      Cervical back: Normal range of motion.   Skin:     General: Skin is warm and dry.   Neurological:      Mental Status: She is alert and oriented to person, place, and time.   Psychiatric:         Attention and Perception: Attention normal.         Mood and Affect: Mood normal.         Speech: Speech normal.         Behavior: Behavior normal. Behavior is cooperative.         Thought Content: Thought content normal.         PATHOLOGY:  TISSUE EXAM, [facility]) (07/20/2022 10:51)       ENDOSCOPY:  UPPER GI ENDOSCOPY (07/20/2022 09:15)   COLONOSCOPY (07/20/2022 10:57)     IMAGING:  Mri brain wo  mra head wo    Result Date: 5/16/2023  No acute intracranial abnormalities. Chronic small vessel ischemic white matter changes and generalized cerebral volume loss. Images personally reviewed, interpreted and dictated by KATHRYN Perry M.D.          RECENT LABS:  Lab Results   Component Value Date    WBC 7.64 03/27/2023    HGB 12.9 03/27/2023    HCT 41.7 03/27/2023    MCV 86.9 03/27/2023    RDW 15.3 03/27/2023     03/27/2023    NEUTRORELPCT 67.5 03/27/2023    LYMPHORELPCT 21.5 03/27/2023    MONORELPCT 6.9 03/27/2023    EOSRELPCT 2.6 03/27/2023    BASORELPCT 1.2 03/27/2023    NEUTROABS 5.16 03/27/2023    LYMPHSABS 1.64 03/27/2023       Lab Results   Component Value Date      (L) 03/27/2023    K 4.9 03/27/2023    CO2 27.1 03/27/2023    CL 99 03/27/2023    BUN 8 03/27/2023    CREATININE 0.92 03/27/2023    EGFRIFNONA 56 (L) 03/08/2022    EGFRIFAFRI >60 03/08/2022    GLUCOSE 106 (H) 03/27/2023    CALCIUM 9.7 03/27/2023    ALKPHOS 87 03/27/2023    AST 19 03/27/2023    ALT 15 03/27/2023    BILITOT 0.3 03/27/2023    ALBUMIN 4.1 03/27/2023    PROTEINTOT 7.4 03/27/2023    MG 1.9 05/21/2022             ASSESSMENT & PLAN:  Based on patient's symptoms-she will continue on lansoprazole 30 mg twice daily dosing.  She is currently well-controlled on this medication.  Due to her constipation, we will be starting MiraLAX 17 g once daily mixed with prune juice which is her current regimen.  Patient was instructed that fixing constipation may help in decreasing diverticulitis flareups.   Diagnosis Plan   1. Chronic idiopathic constipation  polyethylene glycol (MIRALAX) 17 GM/SCOOP powder      2. Generalized abdominal pain  polyethylene glycol (MIRALAX) 17 GM/SCOOP powder      3. Gastroesophageal reflux disease without esophagitis  lansoprazole (PREVACID) 30 MG capsule          Return in about 1 year (around 8/15/2024).        Electronically Signed by: Ruddy Red PA-C , August 15, 2023 15:10 EDT       CC:   Sabas Verde MD

## 2023-09-01 ENCOUNTER — TRANSCRIBE ORDERS (OUTPATIENT)
Dept: ADMINISTRATIVE | Facility: HOSPITAL | Age: 73
End: 2023-09-01
Payer: MEDICARE

## 2023-09-01 DIAGNOSIS — R91.1 PULMONARY NODULE: Primary | ICD-10-CM

## 2023-09-25 ENCOUNTER — HOSPITAL ENCOUNTER (OUTPATIENT)
Dept: CT IMAGING | Facility: HOSPITAL | Age: 73
Discharge: HOME | End: 2023-09-25
Admitting: INTERNAL MEDICINE
Payer: MEDICARE

## 2023-09-25 DIAGNOSIS — R91.1 PULMONARY NODULE: ICD-10-CM

## 2023-09-25 LAB — CREAT BLDA-MCNC: 0.9 MG/DL (ref 0.6–1.3)

## 2023-09-25 PROCEDURE — 25510000001 IOPAMIDOL 61 % SOLUTION: Performed by: INTERNAL MEDICINE

## 2023-09-25 PROCEDURE — 71260 CT THORAX DX C+: CPT | Performed by: RADIOLOGY

## 2023-09-25 PROCEDURE — 82565 ASSAY OF CREATININE: CPT

## 2023-09-25 PROCEDURE — 71260 CT THORAX DX C+: CPT

## 2023-09-25 RX ADMIN — IOPAMIDOL 70 ML: 612 INJECTION, SOLUTION INTRAVENOUS at 10:05

## 2023-12-06 ENCOUNTER — TELEPHONE (OUTPATIENT)
Dept: GASTROENTEROLOGY | Facility: CLINIC | Age: 73
End: 2023-12-06
Payer: MEDICARE

## 2023-12-06 NOTE — TELEPHONE ENCOUNTER
Patient called stating that her bentyl is not helping. She said her belly pain is causing her to be nauseated. She said she was given Levsinex 0.375mg in the ER about a month ago and it has been the only thing that has helped her. She was wondering if this was something you could send her in. Thank you!

## 2023-12-07 DIAGNOSIS — R10.84 GENERALIZED ABDOMINAL PAIN: ICD-10-CM

## 2023-12-07 DIAGNOSIS — R10.84 GENERALIZED ABDOMINAL PAIN: Primary | ICD-10-CM

## 2023-12-07 RX ORDER — HYOSCYAMINE SULFATE 0.12 MG/1
0.12 TABLET SUBLINGUAL EVERY 8 HOURS PRN
Qty: 30 EACH | Refills: 3 | Status: SHIPPED | OUTPATIENT
Start: 2023-12-07 | End: 2023-12-07 | Stop reason: SDUPTHER

## 2023-12-07 RX ORDER — HYOSCYAMINE SULFATE 0.12 MG/1
0.12 TABLET SUBLINGUAL EVERY 8 HOURS PRN
Qty: 30 EACH | Refills: 3 | Status: SHIPPED | OUTPATIENT
Start: 2023-12-07

## 2023-12-08 ENCOUNTER — TELEPHONE (OUTPATIENT)
Dept: GASTROENTEROLOGY | Facility: CLINIC | Age: 73
End: 2023-12-08
Payer: MEDICARE

## 2023-12-08 NOTE — TELEPHONE ENCOUNTER
Patient called stating that her medication costs too much for her to . She said the brand Levsinex was cheaper for her with her insurance.

## 2024-01-09 ENCOUNTER — TRANSCRIBE ORDERS (OUTPATIENT)
Dept: ADMINISTRATIVE | Facility: HOSPITAL | Age: 74
End: 2024-01-09
Payer: MEDICARE

## 2024-01-09 DIAGNOSIS — R91.1 LUNG NODULE: Primary | ICD-10-CM

## 2024-01-12 DIAGNOSIS — K57.92 DIVERTICULITIS: ICD-10-CM

## 2024-01-12 DIAGNOSIS — R10.84 GENERALIZED ABDOMINAL PAIN: ICD-10-CM

## 2024-01-15 RX ORDER — DICYCLOMINE HYDROCHLORIDE 10 MG/1
10 CAPSULE ORAL
Qty: 120 CAPSULE | Refills: 0 | OUTPATIENT
Start: 2024-01-15

## 2024-03-08 ENCOUNTER — TELEPHONE (OUTPATIENT)
Dept: GASTROENTEROLOGY | Facility: CLINIC | Age: 74
End: 2024-03-08
Payer: MEDICARE

## 2024-03-08 DIAGNOSIS — R10.84 GENERALIZED ABDOMINAL PAIN: ICD-10-CM

## 2024-03-08 DIAGNOSIS — K57.92 DIVERTICULITIS: Primary | ICD-10-CM

## 2024-03-08 NOTE — TELEPHONE ENCOUNTER
Patient called in stating she was in a diverticulitis flare up and was wondering if something could be called in for her. She said she is going out of town later tonight.

## 2024-03-12 ENCOUNTER — HOSPITAL ENCOUNTER (OUTPATIENT)
Facility: HOSPITAL | Age: 74
Discharge: HOME | End: 2024-03-12
Admitting: NURSE PRACTITIONER
Payer: MEDICARE

## 2024-03-12 DIAGNOSIS — R10.84 GENERALIZED ABDOMINAL PAIN: ICD-10-CM

## 2024-03-12 DIAGNOSIS — K57.92 DIVERTICULITIS: ICD-10-CM

## 2024-03-12 PROCEDURE — 74177 CT ABD & PELVIS W/CONTRAST: CPT

## 2024-03-12 PROCEDURE — 82565 ASSAY OF CREATININE: CPT

## 2024-03-12 PROCEDURE — 25510000001 IOPAMIDOL 61 % SOLUTION: Performed by: NURSE PRACTITIONER

## 2024-03-12 PROCEDURE — 74177 CT ABD & PELVIS W/CONTRAST: CPT | Performed by: RADIOLOGY

## 2024-03-12 RX ADMIN — IOPAMIDOL 100 ML: 612 INJECTION, SOLUTION INTRAVENOUS at 16:07

## 2024-03-13 ENCOUNTER — TELEPHONE (OUTPATIENT)
Dept: GASTROENTEROLOGY | Facility: CLINIC | Age: 74
End: 2024-03-13
Payer: MEDICARE

## 2024-03-13 LAB — CREAT BLDA-MCNC: 0.8 MG/DL (ref 0.6–1.3)

## 2024-03-13 NOTE — TELEPHONE ENCOUNTER
Patient was called with CT A/P results from 3/12/2024 which revealed diverticulosis of sigmoid colon with equivocal stranding in the pericolonic space which could represent very early/minimal diverticulitis changes or may be within normal limits.  Over the weekend, patient was having constant lower abdominal pain.  However, at present patient reports having improvement in pain.  She has been struggling with constipation recently which is likely contributing.  Therefore, recommended she start daily fiber supplement with either Citrucel or Metamucil daily to help promote bowel regularity and reduce pressure in her colon.  Recommended she push oral hydration with water. She can also take miralax as needed to help prevent straining which can exacerbate diverticular disease.  Given current improvement in abdominal pain and recent CT findings, will avoid antibiotics at this time based on risks/benefits.  However, patient was advised to notify clinic if she should have worsening of symptoms.  Patient is in agreement with this plan.

## 2024-03-14 ENCOUNTER — TELEPHONE (OUTPATIENT)
Dept: UROLOGY | Facility: CLINIC | Age: 74
End: 2024-03-14
Payer: MEDICARE

## 2024-03-14 NOTE — TELEPHONE ENCOUNTER
Patient called to let you know that her stomach problems have gotten worse.  She would like a call back to discuss it.

## 2024-03-15 DIAGNOSIS — K57.92 DIVERTICULITIS: Primary | ICD-10-CM

## 2024-03-15 RX ORDER — CLARITHROMYCIN 500 MG/1
500 TABLET, COATED ORAL 2 TIMES DAILY
Qty: 28 TABLET | Refills: 0 | Status: SHIPPED | OUTPATIENT
Start: 2024-03-15

## 2024-03-15 RX ORDER — METRONIDAZOLE 500 MG/1
500 TABLET ORAL 3 TIMES DAILY
Qty: 42 TABLET | Refills: 0 | Status: SHIPPED | OUTPATIENT
Start: 2024-03-15 | End: 2024-03-29

## 2024-04-05 ENCOUNTER — TRANSCRIBE ORDERS (OUTPATIENT)
Dept: ADMINISTRATIVE | Facility: HOSPITAL | Age: 74
End: 2024-04-05
Payer: MEDICARE

## 2024-04-05 DIAGNOSIS — E04.1 THYROID NODULE: Primary | ICD-10-CM

## 2024-04-08 ENCOUNTER — TRANSCRIBE ORDERS (OUTPATIENT)
Dept: ADMINISTRATIVE | Facility: HOSPITAL | Age: 74
End: 2024-04-08
Payer: MEDICARE

## 2024-04-08 DIAGNOSIS — Z12.31 BREAST CANCER SCREENING BY MAMMOGRAM: Primary | ICD-10-CM

## 2024-04-08 DIAGNOSIS — R79.89 ELEVATED LIVER FUNCTION TESTS: Primary | ICD-10-CM

## 2024-04-12 ENCOUNTER — HOSPITAL ENCOUNTER (OUTPATIENT)
Dept: ULTRASOUND IMAGING | Facility: HOSPITAL | Age: 74
Discharge: HOME | End: 2024-04-12
Payer: MEDICARE

## 2024-04-12 ENCOUNTER — HOSPITAL ENCOUNTER (OUTPATIENT)
Dept: MAMMOGRAPHY | Facility: HOSPITAL | Age: 74
Discharge: HOME | End: 2024-04-12
Payer: MEDICARE

## 2024-04-12 ENCOUNTER — HOSPITAL ENCOUNTER (EMERGENCY)
Facility: HOSPITAL | Age: 74
Discharge: HOME | End: 2024-04-12
Attending: STUDENT IN AN ORGANIZED HEALTH CARE EDUCATION/TRAINING PROGRAM
Payer: MEDICARE

## 2024-04-12 VITALS
BODY MASS INDEX: 34.32 KG/M2 | OXYGEN SATURATION: 97 % | WEIGHT: 206 LBS | HEART RATE: 80 BPM | TEMPERATURE: 98.4 F | RESPIRATION RATE: 14 BRPM | HEIGHT: 65 IN | DIASTOLIC BLOOD PRESSURE: 70 MMHG | SYSTOLIC BLOOD PRESSURE: 121 MMHG

## 2024-04-12 DIAGNOSIS — S20.461A TICK BITE OF RIGHT BACK WALL OF THORAX, INITIAL ENCOUNTER: Primary | ICD-10-CM

## 2024-04-12 DIAGNOSIS — Z12.31 BREAST CANCER SCREENING BY MAMMOGRAM: ICD-10-CM
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
## 2024-04-12 DIAGNOSIS — R79.89 ELEVATED LIVER FUNCTION TESTS: ICD-10-CM

## 2024-04-12 DIAGNOSIS — E04.1 THYROID NODULE: ICD-10-CM

## 2024-04-12 LAB
BASOPHILS # BLD AUTO: 0.08 10*3/MM3 (ref 0–0.2)
BASOPHILS NFR BLD AUTO: 1.3 % (ref 0–1.5)
CRP SERPL-MCNC: <0.3 MG/DL (ref 0–0.5)
DEPRECATED RDW RBC AUTO: 49.2 FL (ref 37–54)
EOSINOPHIL # BLD AUTO: 0.16 10*3/MM3 (ref 0–0.4)
EOSINOPHIL NFR BLD AUTO: 2.6 % (ref 0.3–6.2)
ERYTHROCYTE [DISTWIDTH] IN BLOOD BY AUTOMATED COUNT: 15.4 % (ref 12.3–15.4)
HCT VFR BLD AUTO: 38.7 % (ref 34–46.6)
HGB BLD-MCNC: 12.1 G/DL (ref 12–15.9)
HOLD SPECIMEN: NORMAL
HOLD SPECIMEN: NORMAL
IMM GRANULOCYTES # BLD AUTO: 0.01 10*3/MM3 (ref 0–0.05)
IMM GRANULOCYTES NFR BLD AUTO: 0.2 % (ref 0–0.5)
LYMPHOCYTES # BLD AUTO: 1.44 10*3/MM3 (ref 0.7–3.1)
LYMPHOCYTES NFR BLD AUTO: 23.2 % (ref 19.6–45.3)
MCH RBC QN AUTO: 27.3 PG (ref 26.6–33)
MCHC RBC AUTO-ENTMCNC: 31.3 G/DL (ref 31.5–35.7)
MCV RBC AUTO: 87.4 FL (ref 79–97)
MONOCYTES # BLD AUTO: 0.36 10*3/MM3 (ref 0.1–0.9)
MONOCYTES NFR BLD AUTO: 5.8 % (ref 5–12)
NEUTROPHILS NFR BLD AUTO: 4.16 10*3/MM3 (ref 1.7–7)
NEUTROPHILS NFR BLD AUTO: 66.9 % (ref 42.7–76)
NRBC BLD AUTO-RTO: 0 /100 WBC (ref 0–0.2)
PLATELET # BLD AUTO: 311 10*3/MM3 (ref 140–450)
PMV BLD AUTO: 8.8 FL (ref 6–12)
RBC # BLD AUTO: 4.43 10*6/MM3 (ref 3.77–5.28)
WBC NRBC COR # BLD AUTO: 6.21 10*3/MM3 (ref 3.4–10.8)
WHOLE BLOOD HOLD COAG: NORMAL
WHOLE BLOOD HOLD SPECIMEN: NORMAL

## 2024-04-12 PROCEDURE — 86140 C-REACTIVE PROTEIN: CPT | Performed by: PHYSICIAN ASSISTANT

## 2024-04-12 PROCEDURE — 77063 BREAST TOMOSYNTHESIS BI: CPT

## 2024-04-12 PROCEDURE — 36415 COLL VENOUS BLD VENIPUNCTURE: CPT

## 2024-04-12 PROCEDURE — 85025 COMPLETE CBC W/AUTO DIFF WBC: CPT | Performed by: PHYSICIAN ASSISTANT

## 2024-04-12 PROCEDURE — 77067 SCR MAMMO BI INCL CAD: CPT

## 2024-04-12 PROCEDURE — 76700 US EXAM ABDOM COMPLETE: CPT

## 2024-04-12 PROCEDURE — 99283 EMERGENCY DEPT VISIT LOW MDM: CPT

## 2024-04-12 PROCEDURE — 76536 US EXAM OF HEAD AND NECK: CPT

## 2024-04-12 RX ORDER — DOXYCYCLINE 100 MG/1
100 CAPSULE ORAL 2 TIMES DAILY
Qty: 28 CAPSULE | Refills: 0 | Status: SHIPPED | OUTPATIENT
Start: 2024-04-12

## 2024-04-12 NOTE — ED PROVIDER NOTES
Subjective   History of Present Illness  73-year-old female with past medical history of sleep apnea, migraines, IBS, hypertension, GERD, fibromyalgia, diabetes, asthma, anemia, and arthritis presents to the emergency room with a tick bite to the right side of her back.  Patient states she noticed the tick on Monday secondary to itching and states she was unable to see it but her son looked and there was an embedded tick.  She states since that time has had redness around it.  She denies fever, chills, or bodyaches.  Denies any other injury, complaints, or concerns at this time    History provided by:  Patient   used: No        Review of Systems   Constitutional: Negative.  Negative for fever.   HENT: Negative.     Respiratory: Negative.     Cardiovascular: Negative.  Negative for chest pain.   Gastrointestinal: Negative.  Negative for abdominal pain.   Endocrine: Negative.    Genitourinary: Negative.  Negative for dysuria.   Skin:  Positive for wound.   Neurological: Negative.    Psychiatric/Behavioral: Negative.     All other systems reviewed and are negative.      Past Medical History:   Diagnosis Date    Anemia     Arthritis     Asthma     Colon polyp     Diabetes mellitus     Fibromyalgia     GERD (gastroesophageal reflux disease)     Hernia     Hypertension     IBS (irritable bowel syndrome)     Migraine     Sleep apnea        Allergies   Allergen Reactions    Sulfa Antibiotics        Past Surgical History:   Procedure Laterality Date     SECTION      CHOLECYSTECTOMY      COLONOSCOPY      COLONOSCOPY N/A 2022    Procedure: COLONOSCOPY;  Surgeon: Domitila Andersen MD;  Location: [facility];  Service: Gastroenterology;  Laterality: N/A;    DILATATION AND CURETTAGE      ENDOSCOPY N/A 2022    Procedure: ESOPHAGOGASTRODUODENOSCOPY WITH BIOPSY;  Surgeon: Domitila Andersen MD;  Location: [facility] OR;  Service: Gastroenterology;  Laterality: N/A;    EYE SURGERY       HYSTERECTOMY  1992    benign       Family History   Problem Relation Age of Onset    Breast cancer Neg Hx        Social History     Socioeconomic History    Marital status:    Tobacco Use    Smoking status: Never    Smokeless tobacco: Never   Vaping Use    Vaping status: Never Used   Substance and Sexual Activity    Alcohol use: No    Drug use: No    Sexual activity: Defer           Objective   Physical Exam  Vitals and nursing note reviewed.   Constitutional:       General: She is not in acute distress.     Appearance: She is well-developed. She is not diaphoretic.   HENT:      Head: Normocephalic and atraumatic.      Right Ear: External ear normal.      Left Ear: External ear normal.      Nose: Nose normal.   Eyes:      Conjunctiva/sclera: Conjunctivae normal.      Pupils: Pupils are equal, round, and reactive to light.   Neck:      Vascular: No JVD.      Trachea: No tracheal deviation.   Cardiovascular:      Rate and Rhythm: Normal rate and regular rhythm.      Heart sounds: Normal heart sounds. No murmur heard.  Pulmonary:      Effort: Pulmonary effort is normal. No respiratory distress.      Breath sounds: Normal breath sounds. No wheezing.   Abdominal:      General: Bowel sounds are normal.      Palpations: Abdomen is soft.      Tenderness: There is no abdominal tenderness.   Musculoskeletal:         General: No deformity. Normal range of motion.      Cervical back: Normal range of motion and neck supple.   Skin:     General: Skin is warm and dry.      Coloration: Skin is not pale.      Findings: No erythema or rash.   Neurological:      Mental Status: She is alert and oriented to person, place, and time.      Cranial Nerves: No cranial nerve deficit.   Psychiatric:         Behavior: Behavior normal.         Thought Content: Thought content normal.         Procedures           ED Course      Results for orders placed or performed during the hospital encounter of 04/12/24   C-reactive Protein     Specimen: Blood   Result Value Ref Range    C-Reactive Protein <0.30 0.00 - 0.50 mg/dL   CBC Auto Differential    Specimen: Blood   Result Value Ref Range    WBC 6.21 3.40 - 10.80 10*3/mm3    RBC 4.43 3.77 - 5.28 10*6/mm3    Hemoglobin 12.1 12.0 - 15.9 g/dL    Hematocrit 38.7 34.0 - 46.6 %    MCV 87.4 79.0 - 97.0 fL    MCH 27.3 26.6 - 33.0 pg    MCHC 31.3 (L) 31.5 - 35.7 g/dL    RDW 15.4 12.3 - 15.4 %    RDW-SD 49.2 37.0 - 54.0 fl    MPV 8.8 6.0 - 12.0 fL    Platelets 311 140 - 450 10*3/mm3    Neutrophil % 66.9 42.7 - 76.0 %    Lymphocyte % 23.2 19.6 - 45.3 %    Monocyte % 5.8 5.0 - 12.0 %    Eosinophil % 2.6 0.3 - 6.2 %    Basophil % 1.3 0.0 - 1.5 %    Immature Grans % 0.2 0.0 - 0.5 %    Neutrophils, Absolute 4.16 1.70 - 7.00 10*3/mm3    Lymphocytes, Absolute 1.44 0.70 - 3.10 10*3/mm3    Monocytes, Absolute 0.36 0.10 - 0.90 10*3/mm3    Eosinophils, Absolute 0.16 0.00 - 0.40 10*3/mm3    Basophils, Absolute 0.08 0.00 - 0.20 10*3/mm3    Immature Grans, Absolute 0.01 0.00 - 0.05 10*3/mm3    nRBC 0.0 0.0 - 0.2 /100 WBC   Green Top (Gel)   Result Value Ref Range    Extra Tube Hold for add-ons.    Lavender Top   Result Value Ref Range    Extra Tube hold for add-on    Gold Top - SST   Result Value Ref Range    Extra Tube Hold for add-ons.    Light Blue Top   Result Value Ref Range    Extra Tube Hold for add-ons.                                               Medical Decision Making  73-year-old female with past medical history of sleep apnea, migraines, IBS, hypertension, GERD, fibromyalgia, diabetes, asthma, anemia, and arthritis presents to the emergency room with a tick bite to the right side of her back.  Patient states she noticed the tick on Monday secondary to itching and states she was unable to see it but her son looked and there was an embedded tick.  She states since that time has had redness around it.  She denies fever, chills, or bodyaches.  Denies any other injury, complaints, or concerns at this  time    Uncomplicated ED course.  Patient to be treated empirically with doxycycline x 14 days for possible tickborne illness and encouraged to follow-up with primary care provider on an outpatient basis.    Problems Addressed:  Tick bite of right back wall of thorax, initial encounter: complicated acute illness or injury    Amount and/or Complexity of Data Reviewed  Labs: ordered. Decision-making details documented in ED Course.    Risk  Prescription drug management.        Final diagnoses:   Tick bite of right back wall of thorax, initial encounter       ED Disposition  ED Disposition       ED Disposition   Discharge    Condition   Stable    Comment   --               Sabas Verde MD  [address] [facility]  [facility] [address] [phone]    In 2 days           Medication List        New Prescriptions      doxycycline 100 MG capsule  Commonly known as: MONODOX  Take 1 capsule by mouth 2 (Two) Times a Day.               Where to Get Your Medications        These medications were sent to [id number] - [address] AT [facility] [address] & [facility] - [phone]  - [phone] [address] [address]      Phone: [phone]   doxycycline 100 MG capsule            Mary Sharpe PA-C  04/12/24 5700

## 2024-07-21 PROBLEM — R53.1 WEAKNESS: Status: ACTIVE | Noted: 2024-07-21

## 2024-07-21 PROBLEM — Z79.899 HIGH RISK MEDICATION USE: Status: ACTIVE | Noted: 2024-07-21

## 2024-07-21 PROBLEM — M79.641 PAIN IN BOTH HANDS: Status: ACTIVE | Noted: 2024-07-21

## 2024-07-21 PROBLEM — M79.642 PAIN IN BOTH HANDS: Status: ACTIVE | Noted: 2024-07-21

## 2024-07-21 PROBLEM — E55.9 VITAMIN D DEFICIENCY: Status: ACTIVE | Noted: 2024-07-21

## 2024-07-21 PROBLEM — N28.9 RENAL INSUFFICIENCY: Status: ACTIVE | Noted: 2024-07-21

## 2024-07-21 PROBLEM — T38.0X5A CORTICOSTEROIDS ADVERSE REACTION: Status: ACTIVE | Noted: 2024-07-21

## 2024-07-21 PROBLEM — M79.7 FIBROMYALGIA: Status: ACTIVE | Noted: 2024-07-21

## 2024-07-22 ENCOUNTER — TELEPHONE (OUTPATIENT)
Dept: UROLOGY | Facility: CLINIC | Age: 74
End: 2024-07-22
Payer: MEDICARE

## 2024-07-23 NOTE — ASSESSMENT & PLAN NOTE
Cushingoid, Blurred Vision, Weak muscles- SOB with negative Cardiac workup- H/o steroid Myopathy.  Appears to have steroid myopathy sx  CK and aldolase normal 3/22  CK normal 7/23 8/1/22 fasting cortisol was normal     She is no longer on steroids

## 2024-07-23 NOTE — ASSESSMENT & PLAN NOTE
Chronic diffuse intermittent muscle pain.   Aug 2018 CK was normal. GERRI negative RF negative CRP Normal.  Feb 2019 her ESR and CRP were normal.  On Hydrocodone and Lyrica with PCP  Taking Tizanidine prn    Chair aerobics or chair yoga on YouTube.  Continue Tizanidine PRN (non addictive muscle relaxer) pt states it is helpful  She is prescribed Lyrica and hydrocodone from other provider  She did not go to PT for her back due to transportation issues.

## 2024-07-23 NOTE — ASSESSMENT & PLAN NOTE
She c/o weakness if she has to walk very far. She had neg cardio work up and according to pt her CXR and PFT are normal. Difficult to evaluate without seeing her in person Regarding weakness Vs fatigue. ? auto immune Vs Lipitor  Aldolase and CK normal 3/22  Esr and Crp normal 8/22  Difficulty getting up from seated position.  EMG/NCV consistent with LE peripheral neuropathy.   CK normal. She is off statin drugs since June 2023.   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    Exercise for strengthening.  There does not seem to be any inflammatory myositis.  No recent visit with neurology.  She has had normal CK

## 2024-07-23 NOTE — TELEPHONE ENCOUNTER
I called patient, no answer, I left a message on her VM asking her to return my call and let me know what she is needing the Doxycycline for

## 2024-07-23 NOTE — TELEPHONE ENCOUNTER
I spoke to patient, she gets infection in pockets  of her colon that causes severe cramping, Doxycycline is what she normally takes for it.

## 2024-07-23 NOTE — PROGRESS NOTES
Telehealth E-Visit      Date: 2024   Patient Name: Lorenza David  : 1950   MRN: [id number]     Chief Complaint:    Chief Complaint   Patient presents with    Joint Swelling       I have reviewed the E-Visit questionnaire and the patient's answers, my assessment and plan are listed below.     This provider is located at the [facility] (through [facility]), [address], [address] [place] [address] using a secure [employer] Video Visit through [employer]. Patient is being seen remotely via telehealth at their home address in [place], and stated they are in a secure environment for this session. The patient's condition being diagnosed/treated is appropriate for telemedicine. The provider identified herself as well as her credentials. The patient, and/or patients guardian, consent to be seen remotely, and when consent is given they understand that the consent allows for patient identifiable information to be sent to a third party as needed. They may refuse to be seen remotely at any time. The electronic data is encrypted and password protected, and the patient and/or guardian has been advised of the potential risks to privacy not withstanding such measures.    You have chosen to receive care through a telehealth visit. Do you consent to use a video/audio connection for your medical care today? Yes    History of Present Illness: Lorenza David is a 73 y.o. female who is being seen today to follow up with ANDREZ Fish.   She is being seen virtually today for follow-up of temporal arteritis.      X-ray of both hands  Changes of osteoarthritis of most PIP and DIP joints and the right 1st CMC.  Patient called last week with worsening hand pain.  She asked for a refill of Vitamin D prescription as this really seems to help her hand pain.  Dr. Crowell suggested a medrol dose pack and compression gloves as well as an appointment.  Eye doctor has  diagnosed her with Sjogren's and has put her on eye drops      Subjective      I have reviewed and updated the patient's chief complaint, history of present illness, review of systems, past medical history, surgical history, family history, social history, medications and allergy list as appropriate.     Medications:     Current Outpatient Medications:     cefdinir (OMNICEF) 300 MG capsule, TAKE 1 CAPSULE BY MOUTH EVERY 12 HOURS FOR 7 DAYS FOR UTI, Disp: , Rfl:     clobetasol propionate (TEMOVATE) 0.05 % cream, APPLY 1 APPLICATION TOPICALLY TWICE DAILY FOR 7 DAYS, Disp: , Rfl:     famotidine (PEPCID) 20 MG tablet, Take 1 tablet by mouth Every 12 (Twelve) Hours., Disp: , Rfl:     Farxiga 5 MG tablet tablet, Take 1 tablet by mouth Daily., Disp: , Rfl:     mirtazapine (REMERON) 15 MG tablet, Take 1 tablet by mouth every night at bedtime., Disp: , Rfl:     Mounjaro 5 MG/0.5ML solution pen-injector pen, ADMINISTER 5 MG UNDER THE SKIN 1 TIME A WEEK, Disp: , Rfl:     rosuvastatin (CRESTOR) 5 MG tablet, Take 1 tablet by mouth every night at bedtime., Disp: , Rfl:     tiZANidine (ZANAFLEX) 4 MG tablet, Take 1 tablet by mouth 3 times a day., Disp: , Rfl:     traMADol (ULTRAM) 50 MG tablet, Take 1 tablet by mouth Every 12 (Twelve) Hours., Disp: , Rfl:     Xiidra 5 % ophthalmic solution, INSTILL ONE DROP INTO BOTH EYES TWICE DAILY, Disp: , Rfl:     amLODIPine (NORVASC) 5 MG tablet, Take 1 tablet by mouth Daily., Disp: , Rfl:     aspirin 81 MG chewable tablet, Chew 1 tablet Daily., Disp: , Rfl:     dicyclomine (Bentyl) 10 MG capsule, Take 1 capsule by mouth 4 (Four) Times a Day Before Meals & at Bedtime., Disp: 120 capsule, Rfl: 0    doxycycline (MONODOX) 100 MG capsule, Take 1 capsule by mouth 2 (Two) Times a Day., Disp: 28 capsule, Rfl: 0    doxycycline (VIBRAMYCIN) 100 MG capsule, Take 1 capsule by mouth 2 (Two) Times a Day., Disp: 60 capsule, Rfl: 0    DULoxetine (CYMBALTA) 20 MG capsule, Take 1 capsule by mouth Daily.,  Disp: , Rfl:     escitalopram (LEXAPRO) 20 MG tablet, Take 1.5 tablets by mouth Daily., Disp: , Rfl:     fluticasone (FLONASE) 50 MCG/ACT nasal spray, 2 sprays into the nostril(s) as directed by provider Daily. Administer 2 sprays in each nostril for each dose., Disp: 16 g, Rfl: 6    HYDROcodone-acetaminophen (NORCO) 7.5-325 MG per tablet, Take 1 tablet by mouth Every 8 (Eight) Hours As Needed (pain)., Disp: 9 tablet, Rfl: 0    hydrOXYzine (ATARAX) 25 MG tablet, Take 1 tablet by mouth Every 8 (Eight) Hours As Needed for Itching., Disp: 90 tablet, Rfl: 3    Hyoscyamine Sulfate SL (Levsin/SL) 0.125 MG sublingual tablet, Place 0.125 mg under the tongue Every 8 (Eight) Hours As Needed (abdominal pain)., Disp: 30 each, Rfl: 3    lansoprazole (PREVACID) 30 MG capsule, Take 1 capsule by mouth 2 (Two) Times a Day. Take one capsule 30 minutes prior to eating breakfast, Disp: 180 capsule, Rfl: 3    metFORMIN (GLUCOPHAGE) 500 MG tablet, Take 1 tablet by mouth 2 (Two) Times a Day With Meals., Disp: , Rfl:     nystatin (MYCOSTATIN) 283237 UNIT/GM powder, Apply  topically 2 (Two) Times a Day As Needed., Disp: , Rfl:     polyethylene glycol (MIRALAX) 17 GM/SCOOP powder, Take 17 g by mouth Daily., Disp: 527 g, Rfl: 11    predniSONE (DELTASONE) 5 MG tablet, Take 1 tablet by mouth Daily., Disp: , Rfl:     pregabalin (LYRICA) 100 MG capsule, Take 3 capsules by mouth 2 (Two) Times a Day., Disp: , Rfl:     Tocilizumab 162 MG/0.9ML solution prefilled syringe, Inject 0.9 mL under the skin into the appropriate area as directed 1 (One) Time Per Week., Disp: , Rfl:     vitamin D (ERGOCALCIFEROL) 1.25 MG (35639 UT) capsule capsule, Take 1 capsule by mouth 1 (One) Time Per Week., Disp: , Rfl:     Allergies:   Allergies   Allergen Reactions    Sulfa Antibiotics     Latex, Natural Rubber Rash     Other reaction(s): Hives       Objective     Physical Exam:  Vital Signs:   Vitals:    07/24/24 1404   PainSc:   7   PainLoc: Hand     There is no  "height or weight on file to calculate BMI.    Physical Exam    Assessment / Plan      Assessment/Plan:   Diagnoses and all orders for this visit:    1. Temporal arteritis (Primary)  Assessment & Plan:  h/o jaw claudication, headache, scalp tenderness, left eye vision loss, myalgias-neck; saw Dr Shad Mccord w/nl eye exam. Saw Dr Jose Peoples-- who thought she had TA; c/o prox ue and hip mm pain; Was referred for biopsy which she did not do until 7 weeks after on prednisone. Bx was neg. Labs neg except for a mildly elevated crp. Mtx was added as a steroid sparing agent--c/o weakness/SOB after steroid decreased to 5mg/d. Patient was started on Actemra 9/17  CT angio of the head with PCP 3/2021-negative this was ordered due to persistent head aches  Very complicated/confusion case- Has been on Actemra for almost 5 years as well as various doses of steroids. Pt had scalp tenderness, temporal soreness and dim/blurry vision keiko when not on Actemra injections. Eye exam negative for findings.  We have been having to hold her SQ Actemra due to \"diverticulitis\" and shingles- she reports no worsening of symptoms.  ESR 12, CRP not received in 5/23  6 weeks ago she had increased pain over the TMJ joint on the R.  Occ parietal pain.    Doing well.  No headaches.    She has chronic pain in her jaw with chewing.  Lemon drops do help.  No definite GCA symptoms.  Continue off Actemra     7/23 CCP negative, CRP normal, CBC normal, Glucose 136, CK normal, RF3 negative, ESR normal.  4/12/24 normal CRP      2. Fibromyalgia  Assessment & Plan:  Chronic diffuse intermittent muscle pain.   Aug 2018 CK was normal. GERRI negative RF negative CRP Normal.  Feb 2019 her ESR and CRP were normal.  On Hydrocodone and Lyrica with PCP  Taking Tizanidine prn    Chair aerobics or chair yoga on YouTube.  Continue Tizanidine PRN (non addictive muscle relaxer) pt states it is helpful  She is prescribed Lyrica and hydrocodone from other provider  She did " not go to PT for her back due to transportation issues.      3. Muscular weakness  Assessment & Plan:  She c/o weakness if she has to walk very far. She had neg cardio work up and according to pt her CXR and PFT are normal. Difficult to evaluate without seeing her in person Regarding weakness Vs fatigue. ? auto immune Vs Lipitor  Aldolase and CK normal 3/22  Esr and Crp normal 8/22  Difficulty getting up from seated position.  EMG/NCV consistent with LE peripheral neuropathy.   CK normal. She is off statin drugs since June 2023.   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    Exercise for strengthening.  There does not seem to be any inflammatory myositis.  No recent visit with neurology.  She has had normal CK      4. Pain in both hands  Assessment & Plan:  Hands ache more with use.  X-ray of both hands 7/23 Changes of osteoarthritis of most PIP and DIP joints and the right 1sr CMC.  Neg RF3, CCP.    She reports significant pain and swelling in her hands and feet recently.  See below regarding Lyme disease.      5. Vitamin D deficiency  Assessment & Plan:  She has been taking 2000 IU daily      6. Adverse effect of corticosteroids, sequela  Assessment & Plan:  Cushingoid, Blurred Vision, Weak muscles- SOB with negative Cardiac workup- H/o steroid Myopathy.  Appears to have steroid myopathy sx  CK and aldolase normal 3/22  CK normal 7/23 8/1/22 fasting cortisol was normal     She is no longer on steroids      7. Skin lesion  Assessment & Plan:  She notes she has lesions on her chest and neck.  She has seen primary care who told her this was possibly an autoimmune condition.  Seeing as this is a video visit, I am unable to assess the lesions.  She does report she has an upcoming dermatology appointment 8/22/2024.  Agree with dermatology consult.  She reports severe itching due to these lesions.  I will send in hydroxyzine for her to take as needed for itching.  Risks and benefits  discussed.    Orders:  -     hydrOXYzine (ATARAX) 25 MG tablet; Take 1 tablet by mouth Every 8 (Eight) Hours As Needed for Itching.  Dispense: 90 tablet; Refill: 3    8. Positive Lyme disease serology  Assessment & Plan:  She reports since her last office visit in March she has had 3 tick bites.  She reports 1 of these ticks was on her for likely over a month.  She reports bull's-eye rash at the site of the large tick that was on her for a month.  She reports she took 2 weeks of doxycycline from ER with relief in her joint pain in her hands and feet with recurrence of joint pain when she was off doxycycline.  She reports her hands and feet hurt so bad she is not able to walk or use her hands much.  Her PCP gave her 5 days of 40 mg prednisone daily.  This did not help the joints that much.  We will give her 30 days worth of doxycycline.  If no improvement, consider Plaquenil trial and/or referral to infectious disease.      9. Sjogren's syndrome, with unspecified organ involvement  Assessment & Plan:  She reports her eye specialist in [facility] Sigifredo Barreto has diagnosed her with Sjogren's and prescribed eye drops.      Other orders  -     doxycycline (VIBRAMYCIN) 100 MG capsule; Take 1 capsule by mouth 2 (Two) Times a Day.  Dispense: 60 capsule; Refill: 0         Follow Up:   Return in about 6 weeks (around 9/4/2024) for NP, Dr. Crowell.    Any medications prescribed have been sent electronically to   [id number] [address] AT [facility] [address] [facility] - [phone]  - [phone] [address] [address]  Phone: [phone] Fax: [phone]      45 minutes were spent reviewing the patient's questionnaire, formulating a treatment plan, and relaying information to the patient via [employer].    ANDREZ Fish  [facility] [place]  07/24/24  12:30 EDT

## 2024-07-23 NOTE — ASSESSMENT & PLAN NOTE
"h/o jaw claudication, headache, scalp tenderness, left eye vision loss, myalgias-neck; saw Dr Shad Mccord w/nl eye exam. Saw Dr Jose Peoples-- who thought she had TA; c/o prox ue and hip mm pain; Was referred for biopsy which she did not do until 7 weeks after on prednisone. Bx was neg. Labs neg except for a mildly elevated crp. Mtx was added as a steroid sparing agent--c/o weakness/SOB after steroid decreased to 5mg/d. Patient was started on Actemra 9/17  CT angio of the head with PCP 3/2021-negative this was ordered due to persistent head aches  Very complicated/confusion case- Has been on Actemra for almost 5 years as well as various doses of steroids. Pt had scalp tenderness, temporal soreness and dim/blurry vision keiko when not on Actemra injections. Eye exam negative for findings.  We have been having to hold her SQ Actemra due to \"diverticulitis\" and shingles- she reports no worsening of symptoms.  ESR 12, CRP not received in 5/23  6 weeks ago she had increased pain over the TMJ joint on the R.  Occ parietal pain.    Doing well.  No headaches.    She has chronic pain in her jaw with chewing.  Lemon drops do help.  No definite GCA symptoms.  Continue off Actemra     7/23 CCP negative, CRP normal, CBC normal, Glucose 136, CK normal, RF3 negative, ESR normal.  4/12/24 normal CRP  "

## 2024-07-23 NOTE — ASSESSMENT & PLAN NOTE
Hands ache more with use.  X-ray of both hands 7/23 Changes of osteoarthritis of most PIP and DIP joints and the right 1sr CMC.  Neg RF3, CCP.    She reports significant pain and swelling in her hands and feet recently.  See below regarding Lyme disease.

## 2024-07-24 ENCOUNTER — TELEPHONE (OUTPATIENT)
Dept: GASTROENTEROLOGY | Facility: CLINIC | Age: 74
End: 2024-07-24
Payer: MEDICARE

## 2024-07-24 ENCOUNTER — TELEMEDICINE (OUTPATIENT)
Age: 74
End: 2024-07-24
Payer: MEDICARE

## 2024-07-24 DIAGNOSIS — M79.641 PAIN IN BOTH HANDS: ICD-10-CM

## 2024-07-24 DIAGNOSIS — R76.8 POSITIVE LYME DISEASE SEROLOGY: ICD-10-CM

## 2024-07-24 DIAGNOSIS — M35.00 SJOGREN'S SYNDROME, WITH UNSPECIFIED ORGAN INVOLVEMENT: ICD-10-CM

## 2024-07-24 DIAGNOSIS — L98.9 SKIN LESION: ICD-10-CM

## 2024-07-24 DIAGNOSIS — M31.6 TEMPORAL ARTERITIS: Primary | ICD-10-CM

## 2024-07-24 DIAGNOSIS — M79.7 FIBROMYALGIA: ICD-10-CM

## 2024-07-24 DIAGNOSIS — E55.9 VITAMIN D DEFICIENCY: ICD-10-CM

## 2024-07-24 DIAGNOSIS — R10.32 LLQ PAIN: Primary | ICD-10-CM

## 2024-07-24 DIAGNOSIS — M79.642 PAIN IN BOTH HANDS: ICD-10-CM

## 2024-07-24 DIAGNOSIS — M62.81 MUSCULAR WEAKNESS: ICD-10-CM

## 2024-07-24 DIAGNOSIS — T38.0X5S ADVERSE EFFECT OF CORTICOSTEROIDS, SEQUELA: ICD-10-CM

## 2024-07-24 PROCEDURE — G2211 COMPLEX E/M VISIT ADD ON: HCPCS | Performed by: NURSE PRACTITIONER

## 2024-07-24 PROCEDURE — 99215 OFFICE O/P EST HI 40 MIN: CPT | Performed by: NURSE PRACTITIONER

## 2024-07-24 PROCEDURE — 1159F MED LIST DOCD IN RCRD: CPT | Performed by: NURSE PRACTITIONER

## 2024-07-24 PROCEDURE — 1160F RVW MEDS BY RX/DR IN RCRD: CPT | Performed by: NURSE PRACTITIONER

## 2024-07-24 RX ORDER — TRAMADOL HYDROCHLORIDE 50 MG/1
1 TABLET ORAL EVERY 12 HOURS SCHEDULED
COMMUNITY
Start: 2024-06-17

## 2024-07-24 RX ORDER — DOXYCYCLINE HYCLATE 100 MG/1
100 CAPSULE ORAL 2 TIMES DAILY
Qty: 60 CAPSULE | Refills: 0 | Status: SHIPPED | OUTPATIENT
Start: 2024-07-24

## 2024-07-24 RX ORDER — FAMOTIDINE 20 MG/1
1 TABLET, FILM COATED ORAL EVERY 12 HOURS SCHEDULED
COMMUNITY
Start: 2024-06-03

## 2024-07-24 RX ORDER — CEFDINIR 300 MG/1
CAPSULE ORAL
COMMUNITY
Start: 2024-07-16

## 2024-07-24 RX ORDER — TIRZEPATIDE 5 MG/.5ML
INJECTION, SOLUTION SUBCUTANEOUS
COMMUNITY
Start: 2024-05-28

## 2024-07-24 RX ORDER — MIRTAZAPINE 15 MG/1
15 TABLET, FILM COATED ORAL
COMMUNITY
Start: 2024-06-06

## 2024-07-24 RX ORDER — ROSUVASTATIN CALCIUM 5 MG/1
5 TABLET, COATED ORAL
COMMUNITY
Start: 2024-06-30

## 2024-07-24 RX ORDER — DAPAGLIFLOZIN 5 MG/1
1 TABLET, FILM COATED ORAL DAILY
COMMUNITY
Start: 2024-04-03

## 2024-07-24 RX ORDER — HYDROXYZINE HYDROCHLORIDE 25 MG/1
25 TABLET, FILM COATED ORAL EVERY 8 HOURS PRN
Qty: 90 TABLET | Refills: 3 | Status: SHIPPED | OUTPATIENT
Start: 2024-07-24

## 2024-07-24 RX ORDER — LIFITEGRAST 50 MG/ML
SOLUTION/ DROPS OPHTHALMIC
COMMUNITY
Start: 2024-04-08

## 2024-07-24 RX ORDER — CLOBETASOL PROPIONATE 0.5 MG/G
CREAM TOPICAL
COMMUNITY
Start: 2024-07-09

## 2024-07-24 RX ORDER — TIZANIDINE 4 MG/1
1 TABLET ORAL 3 TIMES DAILY
COMMUNITY
Start: 2024-06-03

## 2024-07-24 NOTE — ASSESSMENT & PLAN NOTE
She reports since her last office visit in March she has had 3 tick bites.  She reports 1 of these ticks was on her for likely over a month.  She reports bull's-eye rash at the site of the large tick that was on her for a month.  She reports she took 2 weeks of doxycycline from ER with relief in her joint pain in her hands and feet with recurrence of joint pain when she was off doxycycline.  She reports her hands and feet hurt so bad she is not able to walk or use her hands much.  Her PCP gave her 5 days of 40 mg prednisone daily.  This did not help the joints that much.  We will give her 30 days worth of doxycycline.  If no improvement, consider Plaquenil trial and/or referral to infectious disease.

## 2024-07-24 NOTE — ASSESSMENT & PLAN NOTE
Lupe is a 34 year old year old female here for an OB check.  She is      .  Gestational Age: 33w5d.  Concerns today include: none    She reports fetal movement.   She denies bleeding.  She denies cramping.  She denies nausea.  She denies breast tenderness.    If your visit includes an exam today, would you like an assistant to be present in the room during that time?no    [employer] application verified/signed up YES.   Pt informed that [employer] is loaded with  provider- approved content, customized resources, and weekly tasks and tips    Patient is currently part of the covid-19 OB modified schedule: NO   She reports her eye specialist in [facility] Sigifredo Barreto has diagnosed her with Sjogren's and prescribed eye drops.

## 2024-07-24 NOTE — TELEPHONE ENCOUNTER
Patient called back regarding the infection she has in her colon. She stated she thought she was allergic to metronidazole, but she is not and she has taken it with no problem. She  had previously called asking for Doxycycline for the infection on her colon.

## 2024-07-24 NOTE — ASSESSMENT & PLAN NOTE
She notes she has lesions on her chest and neck.  She has seen primary care who told her this was possibly an autoimmune condition.  Seeing as this is a video visit, I am unable to assess the lesions.  She does report she has an upcoming dermatology appointment 8/22/2024.  Agree with dermatology consult.  She reports severe itching due to these lesions.  I will send in hydroxyzine for her to take as needed for itching.  Risks and benefits discussed.

## 2024-07-25 NOTE — TELEPHONE ENCOUNTER
I called patient, no answer, I left a message on her VM that per Anitra, Will order CT abdomen pelvis to evaluate for diverticulitis.  Patient has previously been following with Ruddy Red PA-C and looks like she has upcoming follow-up in August.  Please make sure she keeps her follow-up in August.

## 2024-08-19 ENCOUNTER — OFFICE VISIT (OUTPATIENT)
Dept: GASTROENTEROLOGY | Facility: CLINIC | Age: 74
End: 2024-08-19
Payer: MEDICARE

## 2024-08-19 ENCOUNTER — LAB (OUTPATIENT)
Dept: LAB | Facility: HOSPITAL | Age: 74
End: 2024-08-19
Payer: MEDICARE

## 2024-08-19 VITALS
SYSTOLIC BLOOD PRESSURE: 125 MMHG | HEART RATE: 65 BPM | BODY MASS INDEX: 34.16 KG/M2 | DIASTOLIC BLOOD PRESSURE: 73 MMHG | WEIGHT: 205 LBS | HEIGHT: 65 IN

## 2024-08-19 DIAGNOSIS — B83.9 WORMS IN STOOL: ICD-10-CM

## 2024-08-19 DIAGNOSIS — Z87.19 HISTORY OF DIVERTICULITIS: ICD-10-CM

## 2024-08-19 DIAGNOSIS — K59.04 CHRONIC IDIOPATHIC CONSTIPATION: ICD-10-CM

## 2024-08-19 DIAGNOSIS — B83.9 WORMS IN STOOL: Primary | ICD-10-CM

## 2024-08-19 DIAGNOSIS — L29.0 RECTAL ITCHING: ICD-10-CM

## 2024-08-19 PROCEDURE — 99214 OFFICE O/P EST MOD 30 MIN: CPT | Performed by: NURSE PRACTITIONER

## 2024-08-19 PROCEDURE — 87427 SHIGA-LIKE TOXIN AG IA: CPT

## 2024-08-19 PROCEDURE — 87045 FECES CULTURE AEROBIC BACT: CPT

## 2024-08-19 PROCEDURE — 87329 GIARDIA AG IA: CPT

## 2024-08-19 PROCEDURE — 1159F MED LIST DOCD IN RCRD: CPT | Performed by: NURSE PRACTITIONER

## 2024-08-19 PROCEDURE — 87046 STOOL CULTR AEROBIC BACT EA: CPT

## 2024-08-19 PROCEDURE — 87328 CRYPTOSPORIDIUM AG IA: CPT

## 2024-08-19 PROCEDURE — 1160F RVW MEDS BY RX/DR IN RCRD: CPT | Performed by: NURSE PRACTITIONER

## 2024-08-19 NOTE — PROGRESS NOTES
"DATE:  8/19/2024    DIAGNOSIS: History of diverticulitis, constipation, rectal itching    CHIEF COMPLAINT:  Rectal itching, \"worms in her intestines\"     Interval History:  Ms. David has previously been followed Ruddy Red PA-C and was last seen in August 2023.  Please see previous notes for prior management.  In review of her records, patient has history of constipation and diverticulitis.  In mid March 2024, she was advised to start daily fiber supplement with Citrucel to help promote bowel regularity and reduce pressure in her colon.  She was also advised to take miralax as needed to help prevent straining which can exacerbate diverticular disease.  With Citrucel, she is currently having daily BM with stool type III on [place] stool scale.  She denies having any recent diverticulitis flares.  Patient reports she has been feeling poorly over the past~4 months.  She reports having several tick bites and has been treated with doxycycline by her PCP 3 separate times.  She reports she was tested for Lyme disease which was negative.  She reports struggling with pruritus and diffuse rash and is scheduled to see dermatology later this week.  Her main complaint today is rectal itching particularly at night and says her intestines are infested with worms.  At night, she reports worms will crawl out of her and onto her stomach.  Patient reports she has followed up with her PCP and also been seen in the ED.  She reports she has been treated with ivermectin x 3. Most recently, she was started on albendazole 400 mg p.o. x 1 last week without improvement.  Patient reports the above symptoms are very bothersome.  She denies having abdominal pain, nausea/vomiting or abdominal bloating. She has no other complaints today.     PAST MEDICAL HISTORY:  Past Medical History:   Diagnosis Date    Anemia     Arthritis     Asthma     Colon polyp     Diabetes mellitus     Diverticulosis     Elbow tendinitis     Fatigue     Fatty liver  "    Fibromyalgia     GERD (gastroesophageal reflux disease)     Hernia     Hypertension     IBS (irritable bowel syndrome)     Migraine     Plantar fasciitis     Positive Lyme disease serology     Sleep apnea     Temporal arteritis     Tick bite     Type 2 diabetes mellitus     Vitamin D deficiency     Xerostomia        PAST SURGICAL HISTORY:  Past Surgical History:   Procedure Laterality Date    CATARACT EXTRACTION       SECTION      CHOLECYSTECTOMY      COLONOSCOPY      COLONOSCOPY N/A 2022    Procedure: COLONOSCOPY;  Surgeon: Domitila Andersen MD;  Location: [facility] OR;  Service: Gastroenterology;  Laterality: N/A;    DILATATION AND CURETTAGE      ENDOSCOPY N/A 2022    Procedure: ESOPHAGOGASTRODUODENOSCOPY WITH BIOPSY;  Surgeon: Domitila Andersen MD;  Location: [facility] OR;  Service: Gastroenterology;  Laterality: N/A;    EYE SURGERY      HYSTERECTOMY      benign       SOCIAL HISTORY:  Social History     Socioeconomic History    Marital status:    Tobacco Use    Smoking status: Never    Smokeless tobacco: Never   Vaping Use    Vaping status: Never Used   Substance and Sexual Activity    Alcohol use: No    Drug use: No    Sexual activity: Defer       FAMILY HISTORY:  Family History   Problem Relation Age of Onset    Diabetes Mother     Esophageal cancer Father     Breast cancer Neg Hx          MEDICATIONS:  The current medication list was reviewed in the EMR    Current Outpatient Medications:     amLODIPine (NORVASC) 5 MG tablet, Take 1 tablet by mouth Daily., Disp: , Rfl:     aspirin 81 MG chewable tablet, Chew 1 tablet Daily., Disp: , Rfl:     cefdinir (OMNICEF) 300 MG capsule, TAKE 1 CAPSULE BY MOUTH EVERY 12 HOURS FOR 7 DAYS FOR UTI, Disp: , Rfl:     clobetasol propionate (TEMOVATE) 0.05 % cream, APPLY 1 APPLICATION TOPICALLY TWICE DAILY FOR 7 DAYS, Disp: , Rfl:     dicyclomine (Bentyl) 10 MG capsule, Take 1 capsule by mouth 4 (Four) Times a Day Before Meals & at  Bedtime., Disp: 120 capsule, Rfl: 0    doxycycline (MONODOX) 100 MG capsule, Take 1 capsule by mouth 2 (Two) Times a Day., Disp: 28 capsule, Rfl: 0    doxycycline (VIBRAMYCIN) 100 MG capsule, Take 1 capsule by mouth 2 (Two) Times a Day., Disp: 60 capsule, Rfl: 0    DULoxetine (CYMBALTA) 20 MG capsule, Take 1 capsule by mouth Daily., Disp: , Rfl:     escitalopram (LEXAPRO) 20 MG tablet, Take 1.5 tablets by mouth Daily., Disp: , Rfl:     famotidine (PEPCID) 20 MG tablet, Take 1 tablet by mouth Every 12 (Twelve) Hours., Disp: , Rfl:     Farxiga 5 MG tablet tablet, Take 1 tablet by mouth Daily., Disp: , Rfl:     fluticasone (FLONASE) 50 MCG/ACT nasal spray, 2 sprays into the nostril(s) as directed by provider Daily. Administer 2 sprays in each nostril for each dose., Disp: 16 g, Rfl: 6    HYDROcodone-acetaminophen (NORCO) 7.5-325 MG per tablet, Take 1 tablet by mouth Every 8 (Eight) Hours As Needed (pain)., Disp: 9 tablet, Rfl: 0    hydrOXYzine (ATARAX) 25 MG tablet, Take 1 tablet by mouth Every 8 (Eight) Hours As Needed for Itching., Disp: 90 tablet, Rfl: 3    Hyoscyamine Sulfate SL (Levsin/SL) 0.125 MG sublingual tablet, Place 0.125 mg under the tongue Every 8 (Eight) Hours As Needed (abdominal pain)., Disp: 30 each, Rfl: 3    lansoprazole (PREVACID) 30 MG capsule, Take 1 capsule by mouth 2 (Two) Times a Day. Take one capsule 30 minutes prior to eating breakfast, Disp: 180 capsule, Rfl: 3    metFORMIN (GLUCOPHAGE) 500 MG tablet, Take 1 tablet by mouth 2 (Two) Times a Day With Meals., Disp: , Rfl:     mirtazapine (REMERON) 15 MG tablet, Take 1 tablet by mouth every night at bedtime., Disp: , Rfl:     Mounjaro 5 MG/0.5ML solution pen-injector pen, ADMINISTER 5 MG UNDER THE SKIN 1 TIME A WEEK, Disp: , Rfl:     nystatin (MYCOSTATIN) 256668 UNIT/GM powder, Apply  topically 2 (Two) Times a Day As Needed., Disp: , Rfl:     polyethylene glycol (MIRALAX) 17 GM/SCOOP powder, Take 17 g by mouth Daily., Disp: 527 g, Rfl: 11     predniSONE (DELTASONE) 5 MG tablet, Take 1 tablet by mouth Daily., Disp: , Rfl:     pregabalin (LYRICA) 100 MG capsule, Take 3 capsules by mouth 2 (Two) Times a Day., Disp: , Rfl:     rosuvastatin (CRESTOR) 5 MG tablet, Take 1 tablet by mouth every night at bedtime., Disp: , Rfl:     tiZANidine (ZANAFLEX) 4 MG tablet, Take 1 tablet by mouth 3 times a day., Disp: , Rfl:     Tocilizumab 162 MG/0.9ML solution prefilled syringe, Inject 0.9 mL under the skin into the appropriate area as directed 1 (One) Time Per Week., Disp: , Rfl:     traMADol (ULTRAM) 50 MG tablet, Take 1 tablet by mouth Every 12 (Twelve) Hours., Disp: , Rfl:     vitamin D (ERGOCALCIFEROL) 1.25 MG (42613 UT) capsule capsule, Take 1 capsule by mouth 1 (One) Time Per Week., Disp: , Rfl:     Xiidra 5 % ophthalmic solution, INSTILL ONE DROP INTO BOTH EYES TWICE DAILY, Disp: , Rfl:     ALLERGIES:    Allergies   Allergen Reactions    Sulfa Antibiotics     Latex, Natural Rubber Rash     Other reaction(s): Hives     REVIEW OF SYSTEMS:    A comprehensive 14 point review of systems was performed.  Significant findings as mentioned above.  All other systems reviewed and are negative.      Physical Exam   Vital Signs:   Vitals:    08/19/24 1418   BP: 125/73   Pulse: 65    General: Well developed, well nourished, alert and oriented x 3, in no acute distress.   Head: ATNC   Eyes: PERRL, No evidence of conjunctivitis.   Nose: No nasal discharge.   Mouth: Oral mucosal membranes moist. No oral ulceration or hemorrhages.   Neck: Neck supple. No thyromegaly. No JVD.   Lungs: Clear in all fields to A&P without rales, rhonchi or wheezing.   HeartRegular rate and rhythm. No murmurs, rubs, or gallops.   Abdomen: Soft. Bowel sounds are normoactive. Nontender with palpation.   Extremities: No cyanosis or edema.  Neurologic: MS as above. Grossly non focal exam.      IMAGING:  No results found.       RECENT LABS:  Lab Results   Component Value Date    WBC 6.21 04/12/2024    HGB  12.1 2024    HCT 38.7 2024    MCV 87.4 2024    RDW 15.4 2024     2024    NEUTRORELPCT 66.9 2024    LYMPHORELPCT 23.2 2024    MONORELPCT 5.8 2024    EOSRELPCT 2.6 2024    BASORELPCT 1.3 2024    NEUTROABS 4.16 2024    LYMPHSABS 1.44 2024       Lab Results   Component Value Date     (L) 2023    K 4.9 2023    CO2 27.1 2023    CL 99 2023    BUN 8 2023    CREATININE 0.80 2024    EGFRIFNONA 56 (L) 2022    EGFRIFAFRI >60 2022    GLUCOSE 106 (H) 2023    CALCIUM 9.7 2023    ALKPHOS 87 2023    AST 19 2023    ALT 15 2023    BILITOT 0.3 2023    ALBUMIN 4.1 2023    PROTEINTOT 7.4 2023    MG 1.9 2022     ASSESSMENT & PLAN:  Lorenza David is a very pleasant 73 y.o. female with    1.  History of diverticulitis:  2. Constipation:  -Continue Citrucel 2 tablespoons in 8 ounces of water daily to help promote bowel regularity and reduce pressure in her colon.  Recommended she push oral hydration with water. She can also take miralax as needed to help prevent straining which can exacerbate diverticular disease.   -She has not had recent diverticulitis flare.  Will monitor.    3.  Rectal itchin.?  Pinworms:  -As above, the above has been an ongoing issue for the past few months.  Reportedly, she has been treated by the ED and PCP with ivermectin x 3.  Most recently, she was started on albendazole 400 mg p.o. x 1 last week without improvement.  Will obtain stool studies today including stool for O&P and stool culture.  Based on findings, will discuss further management.  -Will give tentative follow-up in 8 to 12 weeks for symptom check.        Electronically Signed by: ANDREZ Viera , 2024 14:20 EDT       CC:   Sabas Verde MD

## 2024-08-21 ENCOUNTER — TELEPHONE (OUTPATIENT)
Dept: UROLOGY | Facility: CLINIC | Age: 74
End: 2024-08-21
Payer: MEDICARE

## 2024-08-21 LAB
CRYPTOSP AG STL QL IA: NEGATIVE
G LAMBLIA AG STL QL IA: NEGATIVE

## 2024-08-22 ENCOUNTER — TELEPHONE (OUTPATIENT)
Dept: GASTROENTEROLOGY | Facility: CLINIC | Age: 74
End: 2024-08-22
Payer: MEDICARE

## 2024-08-22 DIAGNOSIS — K21.9 GASTROESOPHAGEAL REFLUX DISEASE WITHOUT ESOPHAGITIS: ICD-10-CM

## 2024-08-22 RX ORDER — LANSOPRAZOLE 30 MG/1
30 CAPSULE, DELAYED RELEASE ORAL 2 TIMES DAILY
Qty: 180 CAPSULE | Refills: 3 | Status: SHIPPED | OUTPATIENT
Start: 2024-08-22

## 2024-08-22 NOTE — TELEPHONE ENCOUNTER
Please let patient know her stool studies for ova and parasites and stool culture were both negative.

## 2024-08-22 NOTE — TELEPHONE ENCOUNTER
I called patient, no answer, I left a message on her VM that per Anitra, Please let patient know her stool studies for ova and parasites was negative,

## 2024-08-22 NOTE — TELEPHONE ENCOUNTER
I spoke to patient, notified her that per Anitra, she sent in a refill on Lansoprazole 30mg to her pharmacy.

## 2024-08-22 NOTE — TELEPHONE ENCOUNTER
I called patient, no answer, I left a message on her VM that per Anitra, Please let patient know her stool studies for ova and parasites and stool culture were both negative.

## 2024-08-22 NOTE — TELEPHONE ENCOUNTER
Patient called requesting a refill on Lansoprazole 30mg. She forgot to ask for a refill at her last visit.

## 2024-08-26 ENCOUNTER — NURSE TRIAGE (OUTPATIENT)
Dept: CALL CENTER | Facility: HOSPITAL | Age: 74
End: 2024-08-26
Payer: MEDICARE

## 2024-08-26 LAB
BACTERIA SPEC CULT: NORMAL
BACTERIA SPEC CULT: NORMAL
CAMPYLOBACTER STL CULT: NORMAL
E COLI SXT STL QL IA: NEGATIVE
SALM + SHIG STL CULT: NORMAL

## 2024-08-26 NOTE — TELEPHONE ENCOUNTER
Reason for Disposition   Patient sounds very sick or weak to the triager    Additional Information   Negative: Shock suspected (e.g., cold/pale/clammy skin, too weak to stand, low BP, rapid pulse)   Negative: Sounds like a life-threatening emergency to the triager   Negative: [1] Nausea or vomiting AND [2] pregnancy < 20 weeks   Negative: Menstrual Period - Missed or Late (i.e., pregnancy suspected)   Negative: Heat exhaustion suspected (i.e., dehydration from heat exposure)   Negative: Motion sickness suspected (i.e., nausea with car, plane, boat, or train travel)   Negative: Anxiety or stress suspected (i.e., nausea with anxiety attacks or stressful situations)   Negative: Traumatic Brain Injury (TBI) suspected   Negative: Nausea (or Vomiting) in a cancer patient who is currently (or recently) receiving chemotherapy or radiation therapy, or cancer patient who has metastatic or end-stage cancer and is receiving palliative care   Negative: Vomiting occurs   Negative: Other symptom is present, see that guideline (e.g., chest pain, headache, dizziness, abdominal pain, colds, sore throat, etc.).   Negative: Unable to walk, or can only walk with assistance (e.g., requires support)   Negative: Difficulty breathing   Negative: [1] Insulin-dependent diabetes (Type I) AND [2] glucose > 400 mg/dl (22 mmol/l)   Negative: [1] Drinking very little AND [2] dehydration suspected (e.g., no urine > 12 hours, very dry mouth, very lightheaded)   Negative: Fever > 104 F (40 C)   Negative: [1] Fever > 101 F (38.3 C) AND [2] age > 60 years   Negative: [1] Fever > 100.0 F (37.8 C) AND [2] bedridden (e.g., CVA, chronic illness, recovering from surgery)   Negative: [1] Fever > 100.0 F (37.8 C) AND [2] diabetes mellitus or weak immune system (e.g., HIV positive, cancer chemo, splenectomy, organ transplant, chronic steroids)   Negative: Taking any of the following medications: digoxin (Lanoxin), lithium, theophylline, phenytoin (Dilantin)    "Negative: Yellowish color of the skin or white of the eye (i.e., jaundice)   Negative: Fever present > 3 days (72 hours)   Negative: Receiving cancer chemotherapy medication   Negative: Taking prescription medication that could cause nausea (e.g., narcotics/opiates, antibiotics, OCPs, many others)    Answer Assessment - Initial Assessment Questions  1. NAUSEA SEVERITY: \"How bad is the nausea?\" (e.g., mild, moderate, severe; dehydration, weight loss)    - MILD: loss of appetite without change in eating habits    - MODERATE: decreased oral intake without significant weight loss, dehydration, or malnutrition    - SEVERE: inadequate caloric or fluid intake, significant weight loss, symptoms of dehydration      moderate  2. ONSET: \"When did the nausea begin?\"      2 hours prior to call  3. VOMITING: \"Any vomiting?\" If Yes, ask: \"How many times today?\"      2x  4. RECURRENT SYMPTOM: \"Have you had nausea before?\" If Yes, ask: \"When was the last time?\" \"What happened that time?\"      denies  5. CAUSE: \"What do you think is causing the nausea?\"      unknown    Protocols used: Nausea-ADULT-    "

## 2024-08-27 ENCOUNTER — TELEPHONE (OUTPATIENT)
Dept: UROLOGY | Facility: CLINIC | Age: 74
End: 2024-08-27
Payer: MEDICARE

## 2024-08-27 NOTE — TELEPHONE ENCOUNTER
She is calling about a lab result. She went to [facility] and they told her that a lab result was available to her today but we don't have anything new in her chart since 08/19. She is still having the parasites coming from her nose and her rectum and crawling around even though her stool sample is negative. She has an appointment tomorrow to see Anitra. She is upset and says something is wrong with her and no one is helping her.

## 2024-08-29 ENCOUNTER — TELEPHONE (OUTPATIENT)
Dept: GASTROENTEROLOGY | Facility: CLINIC | Age: 74
End: 2024-08-29

## 2024-09-03 ENCOUNTER — APPOINTMENT (OUTPATIENT)
Dept: GENERAL RADIOLOGY | Facility: HOSPITAL | Age: 74
End: 2024-09-03
Payer: MEDICARE

## 2024-09-03 ENCOUNTER — HOSPITAL ENCOUNTER (EMERGENCY)
Facility: HOSPITAL | Age: 74
Discharge: HOME | End: 2024-09-04
Attending: STUDENT IN AN ORGANIZED HEALTH CARE EDUCATION/TRAINING PROGRAM
Payer: MEDICARE

## 2024-09-03 DIAGNOSIS — R53.1 WEAKNESS: Primary | ICD-10-CM

## 2024-09-03 LAB
ALBUMIN SERPL-MCNC: 4.3 G/DL (ref 3.5–5.2)
ALBUMIN/GLOB SERPL: 1.4 G/DL
ALP SERPL-CCNC: 83 U/L (ref 39–117)
ALT SERPL W P-5'-P-CCNC: 10 U/L (ref 1–33)
AMPHET+METHAMPHET UR QL: NEGATIVE
AMPHETAMINES UR QL: NEGATIVE
ANION GAP SERPL CALCULATED.3IONS-SCNC: 11.3 MMOL/L (ref 5–15)
APAP SERPL-MCNC: <5 MCG/ML (ref 0–30)
AST SERPL-CCNC: 16 U/L (ref 1–32)
BACTERIA UR QL AUTO: ABNORMAL /HPF
BARBITURATES UR QL SCN: NEGATIVE
BASOPHILS # BLD AUTO: 0.06 10*3/MM3 (ref 0–0.2)
BASOPHILS NFR BLD AUTO: 1.1 % (ref 0–1.5)
BENZODIAZ UR QL SCN: NEGATIVE
BILIRUB SERPL-MCNC: 0.3 MG/DL (ref 0–1.2)
BILIRUB UR QL STRIP: NEGATIVE
BUN SERPL-MCNC: 7 MG/DL (ref 8–23)
BUN/CREAT SERPL: 7.2 (ref 7–25)
BUPRENORPHINE SERPL-MCNC: NEGATIVE NG/ML
CALCIUM SPEC-SCNC: 9.9 MG/DL (ref 8.6–10.5)
CANNABINOIDS SERPL QL: NEGATIVE
CHLORIDE SERPL-SCNC: 103 MMOL/L (ref 98–107)
CLARITY UR: CLEAR
CO2 SERPL-SCNC: 24.7 MMOL/L (ref 22–29)
COCAINE UR QL: NEGATIVE
COLOR UR: YELLOW
CREAT SERPL-MCNC: 0.97 MG/DL (ref 0.57–1)
DEPRECATED RDW RBC AUTO: 51.3 FL (ref 37–54)
EGFRCR SERPLBLD CKD-EPI 2021: 62.6 ML/MIN/1.73
EOSINOPHIL # BLD AUTO: 0.17 10*3/MM3 (ref 0–0.4)
EOSINOPHIL NFR BLD AUTO: 3 % (ref 0.3–6.2)
ERYTHROCYTE [DISTWIDTH] IN BLOOD BY AUTOMATED COUNT: 16 % (ref 12.3–15.4)
ETHANOL BLD-MCNC: <10 MG/DL (ref 0–10)
ETHANOL UR QL: <0.01 %
FENTANYL UR-MCNC: NEGATIVE NG/ML
GLOBULIN UR ELPH-MCNC: 3.1 GM/DL
GLUCOSE SERPL-MCNC: 105 MG/DL (ref 65–99)
GLUCOSE UR STRIP-MCNC: NEGATIVE MG/DL
HCT VFR BLD AUTO: 39.7 % (ref 34–46.6)
HGB BLD-MCNC: 12.4 G/DL (ref 12–15.9)
HGB UR QL STRIP.AUTO: NEGATIVE
HOLD SPECIMEN: NORMAL
HYALINE CASTS UR QL AUTO: ABNORMAL /LPF
IMM GRANULOCYTES # BLD AUTO: 0.03 10*3/MM3 (ref 0–0.05)
IMM GRANULOCYTES NFR BLD AUTO: 0.5 % (ref 0–0.5)
KETONES UR QL STRIP: NEGATIVE
LEUKOCYTE ESTERASE UR QL STRIP.AUTO: ABNORMAL
LYMPHOCYTES # BLD AUTO: 1.44 10*3/MM3 (ref 0.7–3.1)
LYMPHOCYTES NFR BLD AUTO: 25.5 % (ref 19.6–45.3)
MAGNESIUM SERPL-MCNC: 2.1 MG/DL (ref 1.6–2.4)
MCH RBC QN AUTO: 27.3 PG (ref 26.6–33)
MCHC RBC AUTO-ENTMCNC: 31.2 G/DL (ref 31.5–35.7)
MCV RBC AUTO: 87.4 FL (ref 79–97)
METHADONE UR QL SCN: NEGATIVE
MONOCYTES # BLD AUTO: 0.31 10*3/MM3 (ref 0.1–0.9)
MONOCYTES NFR BLD AUTO: 5.5 % (ref 5–12)
NEUTROPHILS NFR BLD AUTO: 3.64 10*3/MM3 (ref 1.7–7)
NEUTROPHILS NFR BLD AUTO: 64.4 % (ref 42.7–76)
NITRITE UR QL STRIP: NEGATIVE
NRBC BLD AUTO-RTO: 0 /100 WBC (ref 0–0.2)
OPIATES UR QL: NEGATIVE
OXYCODONE UR QL SCN: NEGATIVE
PCP UR QL SCN: NEGATIVE
PH UR STRIP.AUTO: 6 [PH] (ref 5–8)
PLATELET # BLD AUTO: 331 10*3/MM3 (ref 140–450)
PMV BLD AUTO: 9.2 FL (ref 6–12)
POTASSIUM SERPL-SCNC: 4.7 MMOL/L (ref 3.5–5.2)
PROT SERPL-MCNC: 7.4 G/DL (ref 6–8.5)
PROT UR QL STRIP: NEGATIVE
RBC # BLD AUTO: 4.54 10*6/MM3 (ref 3.77–5.28)
RBC # UR STRIP: ABNORMAL /HPF
REF LAB TEST METHOD: ABNORMAL
SALICYLATES SERPL-MCNC: 2.7 MG/DL
SODIUM SERPL-SCNC: 139 MMOL/L (ref 136–145)
SP GR UR STRIP: <=1.005 (ref 1–1.03)
SQUAMOUS #/AREA URNS HPF: ABNORMAL /HPF
TRICYCLICS UR QL SCN: NEGATIVE
TROPONIN T SERPL HS-MCNC: 14 NG/L
UROBILINOGEN UR QL STRIP: ABNORMAL
WBC # UR STRIP: ABNORMAL /HPF
WBC NRBC COR # BLD AUTO: 5.65 10*3/MM3 (ref 3.4–10.8)
WHOLE BLOOD HOLD COAG: NORMAL
WHOLE BLOOD HOLD SPECIMEN: NORMAL

## 2024-09-03 PROCEDURE — 71045 X-RAY EXAM CHEST 1 VIEW: CPT

## 2024-09-03 PROCEDURE — 80179 DRUG ASSAY SALICYLATE: CPT | Performed by: PHYSICIAN ASSISTANT

## 2024-09-03 PROCEDURE — 99284 EMERGENCY DEPT VISIT MOD MDM: CPT

## 2024-09-03 PROCEDURE — 85025 COMPLETE CBC W/AUTO DIFF WBC: CPT

## 2024-09-03 PROCEDURE — 80053 COMPREHEN METABOLIC PANEL: CPT

## 2024-09-03 PROCEDURE — 84484 ASSAY OF TROPONIN QUANT: CPT

## 2024-09-03 PROCEDURE — 80307 DRUG TEST PRSMV CHEM ANLYZR: CPT | Performed by: PHYSICIAN ASSISTANT

## 2024-09-03 PROCEDURE — 93005 ELECTROCARDIOGRAM TRACING: CPT | Performed by: STUDENT IN AN ORGANIZED HEALTH CARE EDUCATION/TRAINING PROGRAM

## 2024-09-03 PROCEDURE — 81001 URINALYSIS AUTO W/SCOPE: CPT

## 2024-09-03 PROCEDURE — 36415 COLL VENOUS BLD VENIPUNCTURE: CPT

## 2024-09-03 PROCEDURE — 71045 X-RAY EXAM CHEST 1 VIEW: CPT | Performed by: RADIOLOGY

## 2024-09-03 PROCEDURE — 82077 ASSAY SPEC XCP UR&BREATH IA: CPT | Performed by: PHYSICIAN ASSISTANT

## 2024-09-03 PROCEDURE — 83735 ASSAY OF MAGNESIUM: CPT

## 2024-09-03 PROCEDURE — 80143 DRUG ASSAY ACETAMINOPHEN: CPT | Performed by: PHYSICIAN ASSISTANT

## 2024-09-03 PROCEDURE — 93010 ELECTROCARDIOGRAM REPORT: CPT | Performed by: STUDENT IN AN ORGANIZED HEALTH CARE EDUCATION/TRAINING PROGRAM

## 2024-09-03 RX ORDER — SODIUM CHLORIDE 0.9 % (FLUSH) 0.9 %
10 SYRINGE (ML) INJECTION AS NEEDED
Status: DISCONTINUED | OUTPATIENT
Start: 2024-09-03 | End: 2024-09-04 | Stop reason: HOSPADM

## 2024-09-04 VITALS
WEIGHT: 206 LBS | HEART RATE: 75 BPM | OXYGEN SATURATION: 100 % | DIASTOLIC BLOOD PRESSURE: 80 MMHG | SYSTOLIC BLOOD PRESSURE: 120 MMHG | RESPIRATION RATE: 16 BRPM | TEMPERATURE: 98.8 F | HEIGHT: 65 IN | BODY MASS INDEX: 34.32 KG/M2

## 2024-09-04 LAB
QT INTERVAL: 398 MS
QTC INTERVAL: 400 MS

## 2024-09-04 NOTE — NURSING NOTE
Spoke to Dr. Patel, discussed assessment and labs, encouraged to follow up with an outpatient provider for psychiatric issues. No acute concerns, okay with discharge. Provided with outpatient resources. [id number]

## 2024-09-04 NOTE — NURSING NOTE
Patient was assessed at the request of ED provider after reporting that she has been having parasites coming out of her nose and in her stool. She reports that she has been to several doctors and provided numerous stool samples, but they have shown nothing. She states that people thing she is crazy but she's not, because they are there. Patient adamantly denied SI/HI/AVH.     Patient is alert and oriented, and states that she only wanted to stay in order to see the infectious disease doctor. After assessment, patient was requesting to leave. ED provider aware, okay with discharge.

## 2024-09-04 NOTE — ED PROVIDER NOTES
"Subjective   History of Present Illness  Weakness   Patient c/o generalized weakness, chills, and N/V x2 days. Patient states \"I have parasites coming out of my nose.\"        Review of Systems    Past Medical History:   Diagnosis Date    Anemia     Arthritis     Asthma     Colon polyp     Diabetes mellitus     Diverticulosis     Elbow tendinitis     Fatigue     Fatty liver     Fibromyalgia     GERD (gastroesophageal reflux disease)     Hernia     Hypertension     IBS (irritable bowel syndrome)     Migraine     Plantar fasciitis     Positive Lyme disease serology     Sleep apnea     Temporal arteritis     Tick bite     Type 2 diabetes mellitus     Vitamin D deficiency     Xerostomia        Allergies   Allergen Reactions    Sulfa Antibiotics     Latex, Natural Rubber Rash     Other reaction(s): Hives       Past Surgical History:   Procedure Laterality Date    CATARACT EXTRACTION       SECTION      CHOLECYSTECTOMY      COLONOSCOPY      COLONOSCOPY N/A 2022    Procedure: COLONOSCOPY;  Surgeon: Domitila Andersen MD;  Location: [facility];  Service: Gastroenterology;  Laterality: N/A;    DILATATION AND CURETTAGE      ENDOSCOPY N/A 2022    Procedure: ESOPHAGOGASTRODUODENOSCOPY WITH BIOPSY;  Surgeon: Domitila Andersen MD;  Location: [facility];  Service: Gastroenterology;  Laterality: N/A;    EYE SURGERY      HYSTERECTOMY      benign       Family History   Problem Relation Age of Onset    Diabetes Mother     Esophageal cancer Father     Breast cancer Neg Hx        Social History     Socioeconomic History    Marital status:    Tobacco Use    Smoking status: Never    Smokeless tobacco: Never   Vaping Use    Vaping status: Never Used   Substance and Sexual Activity    Alcohol use: No    Drug use: No    Sexual activity: Defer           Objective   Physical Exam  Vitals and nursing note reviewed.   Constitutional:       Appearance: She is well-developed.   HENT:      Head: " Normocephalic.   Cardiovascular:      Rate and Rhythm: Normal rate and regular rhythm.   Pulmonary:      Effort: Pulmonary effort is normal.      Breath sounds: Normal breath sounds.   Abdominal:      General: Bowel sounds are normal.      Palpations: Abdomen is soft.      Tenderness: There is no abdominal tenderness.   Musculoskeletal:         General: Normal range of motion.      Cervical back: Neck supple.   Skin:     General: Skin is warm and dry.   Neurological:      Mental Status: She is alert and oriented to person, place, and time.   Psychiatric:         Behavior: Behavior normal.         Thought Content: Thought content normal.         Judgment: Judgment normal.         Procedures           ED Course  ED Course as of 09/04/24 0619   Tue Sep 03, 2024   2214 CXR rad interpreted:  1.  No acute process seen in the lungs  2.  Coarsened bronchovascular pattern to the lungs  3.  Slightly enlarged heart size.  4.  No edema or pneumonia.   5.  No pleural effusion or pneumothorax   [RB]   6806 EKG interpretation: Normal sinus rhythm with sinus arrhythmia 61 bpm QTc is 400 no ST elevations or depressions.    Electronically signed by Cj Bryant DO, 09/03/24, 11:46 PM EDT.   [GF]      ED Course User Index  [GF] Cj Bryant DO  [RB] Benjamin Khalil II, PA                                             Medical Decision Making  Problems Addressed:  Weakness: complicated acute illness or injury    Amount and/or Complexity of Data Reviewed  Labs: ordered.  Radiology: ordered.  ECG/medicine tests: ordered.    Risk  Prescription drug management.      Electronically signed by MARCIE Casas, 09/03/24, 10:04 PM EDT.   Final diagnoses:   Weakness       ED Disposition  ED Disposition       ED Disposition   Discharge    Condition   Stable    Comment   --               Sabas Verde MD  [address] [facility] [id number] [phone]    Schedule an appointment as soon as possible for a visit             Medication List      No changes were made to your prescriptions during this visit.            Benjamin Khalil II, PA  09/04/24 0619

## 2024-10-01 ENCOUNTER — TELEPHONE (OUTPATIENT)
Age: 74
End: 2024-10-01
Payer: MEDICARE

## 2024-10-01 NOTE — TELEPHONE ENCOUNTER
Unfortunately, this is not something that we can treat.  We are treating her for potential Lyme disease related arthritis.  A parasitic infection would need to be treated by her PCP or infectious disease.  She could also be seen at ER if she is feeling very poorly.

## 2024-10-01 NOTE — TELEPHONE ENCOUNTER
"Patient called stating that she has a \"parasite.\" She states they are coming out of her nose and that she scratched a spot on her stomach, it bled, and a worm crawled out. I advised several times that she should proceed to ED but she states she has been to several ED's as well as her PCP and infectious disease. She states they have all done nothing for her and won't even look at samples she brings in. Again, I did advise the best thing to do was proceed to ED but she insisted I ask you what do to. She wanted to know if she could get in for MyChart visit but I don't know what we would do for her even if she came in the office for appointment. Please advise.  "

## 2024-10-02 NOTE — TELEPHONE ENCOUNTER
Pt notified and she would like [facility] to send a referral to Dr. Keita in [place]. Pt knows there is something wrong and she wants someone to help her remove the parasites from her body

## 2024-10-03 NOTE — TELEPHONE ENCOUNTER
"According to pt and her chart, she has been to multiple places, including PCP, ED,UTC, ID. Pt is hoping to find someone that will listen and she states \"we are her last hope\"  "

## 2024-10-15 ENCOUNTER — TELEPHONE (OUTPATIENT)
Age: 74
End: 2024-10-15
Payer: MEDICARE

## 2024-10-15 NOTE — TELEPHONE ENCOUNTER
Infectious Disease was in [place] but she don't remember his name.  She does have pictures.  I got her scheduled with [facility] 10/22. She is going to try to get Ref by someone else this week. She will let us know if she does get ref to ID by someone else.

## 2024-11-07 ENCOUNTER — OFFICE VISIT (OUTPATIENT)
Dept: INFECTIOUS DISEASES | Facility: CLINIC | Age: 74
End: 2024-11-07
Payer: MEDICARE

## 2024-11-07 VITALS
BODY MASS INDEX: 34.66 KG/M2 | HEIGHT: 65 IN | RESPIRATION RATE: 18 BRPM | DIASTOLIC BLOOD PRESSURE: 61 MMHG | OXYGEN SATURATION: 96 % | SYSTOLIC BLOOD PRESSURE: 120 MMHG | HEART RATE: 85 BPM | WEIGHT: 208 LBS

## 2024-11-07 DIAGNOSIS — B89 PARASITIC INFECTION: Primary | ICD-10-CM

## 2024-11-07 DIAGNOSIS — A69.23 LYME ARTHRITIS: ICD-10-CM

## 2024-11-07 DIAGNOSIS — L20.81 ATOPIC NEURODERMATITIS: ICD-10-CM

## 2024-11-07 LAB
CRP SERPL-MCNC: <0.3 MG/DL (ref 0–0.5)
ERYTHROCYTE [SEDIMENTATION RATE] IN BLOOD: 17 MM/HR (ref 0–30)

## 2024-11-07 PROCEDURE — 86003 ALLG SPEC IGE CRUDE XTRC EA: CPT | Performed by: INTERNAL MEDICINE

## 2024-11-07 PROCEDURE — 82785 ASSAY OF IGE: CPT | Performed by: INTERNAL MEDICINE

## 2024-11-07 PROCEDURE — 85652 RBC SED RATE AUTOMATED: CPT | Performed by: INTERNAL MEDICINE

## 2024-11-07 PROCEDURE — 86618 LYME DISEASE ANTIBODY: CPT | Performed by: INTERNAL MEDICINE

## 2024-11-07 PROCEDURE — 86008 ALLG SPEC IGE RECOMB EA: CPT | Performed by: INTERNAL MEDICINE

## 2024-11-07 PROCEDURE — 86140 C-REACTIVE PROTEIN: CPT | Performed by: INTERNAL MEDICINE

## 2024-11-07 NOTE — PROGRESS NOTES
"[facility]         Referring Provider: No referring provider defined for this encounter.    Subjective      Chief Complaint  Initial Evaluation (Parasitusis)    History of Present Illness  Lorenza David is a 74 y.o. female who presents today to [facility] for Initial Consultation  .     A new referral to me and this is what I got from her records: \"Patient called stating that she has a \"parasite.\" She states they are coming out of her nose and that she scratched a spot on her stomach, it bled, and a worm crawled out. I advised several times that she should proceed to ED but she states she has been to several ED's as well as her PCP and infectious disease. She states they have all done nothing for her and won't even look at samples she brings in. Again, I did advise the best thing to do was proceed to ED but she insisted I ask you what do to. She wanted to know if she could get in for MyChart visit but I don't know what we would do for her even if she came in the office for appointment.\"    Currently patient had been seeking help to treat her parasites there are supposedly coming out of her skin and causing her a lot of distress.  Was recently been treated for Lyme arthritis as well.    Past Medical History:   Diagnosis Date    Anemia     Arthritis     Asthma     Colon polyp     Diabetes mellitus     Diverticulosis     Elbow tendinitis     Fatigue     Fatty liver     Fibromyalgia     GERD (gastroesophageal reflux disease)     Hernia     Hypertension     IBS (irritable bowel syndrome)     Migraine     Plantar fasciitis     Positive Lyme disease serology     Sleep apnea     Temporal arteritis     Tick bite     Type 2 diabetes mellitus     Vitamin D deficiency     Xerostomia        Past Surgical History:   Procedure Laterality Date    CATARACT EXTRACTION       SECTION      CHOLECYSTECTOMY      COLONOSCOPY      COLONOSCOPY N/A 2022    Procedure: " COLONOSCOPY;  Surgeon: Domitila Andersen MD;  Location: [facility] OR;  Service: Gastroenterology;  Laterality: N/A;    DILATATION AND CURETTAGE      ENDOSCOPY N/A 07/20/2022    Procedure: ESOPHAGOGASTRODUODENOSCOPY WITH BIOPSY;  Surgeon: Domitila Andersen MD;  Location: [facility] OR;  Service: Gastroenterology;  Laterality: N/A;    EYE SURGERY      HYSTERECTOMY  1992    benign       Social History     Socioeconomic History    Marital status:    Tobacco Use    Smoking status: Never    Smokeless tobacco: Never   Vaping Use    Vaping status: Never Used   Substance and Sexual Activity    Alcohol use: No    Drug use: No    Sexual activity: Defer       Family History  family history includes Diabetes in her mother; Esophageal cancer in her father.    Immunization History   Administered Date(s) Administered    COVID-19 (MODERNA) BIVALENT 12+YRS 11/30/2022    COVID-19 (PFIZER) Purple Cap Monovalent 02/23/2021, 03/16/2021    Fluzone High-Dose 65+YRS 10/01/2017        Allergies  Allergies   Allergen Reactions    Sulfa Antibiotics     Latex, Natural Rubber Rash     Other reaction(s): Hives       The medication list has been reviewed and updated.   Current Medications    Current Outpatient Medications:     amLODIPine (NORVASC) 5 MG tablet, Take 1 tablet by mouth Daily., Disp: , Rfl:     aspirin 81 MG chewable tablet, Chew 1 tablet Daily., Disp: , Rfl:     cefdinir (OMNICEF) 300 MG capsule, TAKE 1 CAPSULE BY MOUTH EVERY 12 HOURS FOR 7 DAYS FOR UTI, Disp: , Rfl:     clobetasol propionate (TEMOVATE) 0.05 % cream, APPLY 1 APPLICATION TOPICALLY TWICE DAILY FOR 7 DAYS, Disp: , Rfl:     dicyclomine (Bentyl) 10 MG capsule, Take 1 capsule by mouth 4 (Four) Times a Day Before Meals & at Bedtime., Disp: 120 capsule, Rfl: 0    doxycycline (MONODOX) 100 MG capsule, Take 1 capsule by mouth 2 (Two) Times a Day., Disp: 28 capsule, Rfl: 0    doxycycline (VIBRAMYCIN) 100 MG capsule, Take 1 capsule by mouth 2 (Two) Times a  Day., Disp: 60 capsule, Rfl: 0    DULoxetine (CYMBALTA) 20 MG capsule, Take 1 capsule by mouth Daily., Disp: , Rfl:     escitalopram (LEXAPRO) 20 MG tablet, Take 1.5 tablets by mouth Daily., Disp: , Rfl:     famotidine (PEPCID) 20 MG tablet, Take 1 tablet by mouth Every 12 (Twelve) Hours., Disp: , Rfl:     Farxiga 5 MG tablet tablet, Take 1 tablet by mouth Daily., Disp: , Rfl:     fluticasone (FLONASE) 50 MCG/ACT nasal spray, 2 sprays into the nostril(s) as directed by provider Daily. Administer 2 sprays in each nostril for each dose., Disp: 16 g, Rfl: 6    HYDROcodone-acetaminophen (NORCO) 7.5-325 MG per tablet, Take 1 tablet by mouth Every 8 (Eight) Hours As Needed (pain)., Disp: 9 tablet, Rfl: 0    hydrOXYzine (ATARAX) 25 MG tablet, Take 1 tablet by mouth Every 8 (Eight) Hours As Needed for Itching., Disp: 90 tablet, Rfl: 3    Hyoscyamine Sulfate SL (Levsin/SL) 0.125 MG sublingual tablet, Place 0.125 mg under the tongue Every 8 (Eight) Hours As Needed (abdominal pain)., Disp: 30 each, Rfl: 3    lansoprazole (PREVACID) 30 MG capsule, Take 1 capsule by mouth 2 (Two) Times a Day. Take one capsule 30 minutes prior to eating breakfast, Disp: 180 capsule, Rfl: 3    metFORMIN (GLUCOPHAGE) 500 MG tablet, Take 1 tablet by mouth 2 (Two) Times a Day With Meals., Disp: , Rfl:     mirtazapine (REMERON) 15 MG tablet, Take 1 tablet by mouth every night at bedtime., Disp: , Rfl:     Mounjaro 5 MG/0.5ML solution pen-injector pen, ADMINISTER 5 MG UNDER THE SKIN 1 TIME A WEEK, Disp: , Rfl:     nystatin (MYCOSTATIN) 545422 UNIT/GM powder, Apply  topically 2 (Two) Times a Day As Needed., Disp: , Rfl:     polyethylene glycol (MIRALAX) 17 GM/SCOOP powder, Take 17 g by mouth Daily., Disp: 527 g, Rfl: 11    predniSONE (DELTASONE) 5 MG tablet, Take 1 tablet by mouth Daily., Disp: , Rfl:     pregabalin (LYRICA) 100 MG capsule, Take 3 capsules by mouth 2 (Two) Times a Day., Disp: , Rfl:     rosuvastatin (CRESTOR) 5 MG tablet, Take 1 tablet  "by mouth every night at bedtime., Disp: , Rfl:     tiZANidine (ZANAFLEX) 4 MG tablet, Take 1 tablet by mouth 3 times a day., Disp: , Rfl:     Tocilizumab 162 MG/0.9ML solution prefilled syringe, Inject 0.9 mL under the skin into the appropriate area as directed 1 (One) Time Per Week., Disp: , Rfl:     traMADol (ULTRAM) 50 MG tablet, Take 1 tablet by mouth Every 12 (Twelve) Hours., Disp: , Rfl:     vitamin D (ERGOCALCIFEROL) 1.25 MG (27698 UT) capsule capsule, Take 1 capsule by mouth 1 (One) Time Per Week., Disp: , Rfl:     Xiidra 5 % ophthalmic solution, INSTILL ONE DROP INTO BOTH EYES TWICE DAILY, Disp: , Rfl:       Review of Systems    Review of Systems   Constitutional:  Positive for activity change, appetite change and fatigue. Negative for chills, diaphoresis and fever.   HENT:  Negative for congestion, dental problem, drooling, ear discharge, ear pain, facial swelling, postnasal drip, sinus pressure, sore throat and swollen glands.    Eyes:  Negative for blurred vision, double vision, pain, discharge and itching.   Respiratory:  Negative for apnea, cough, choking, chest tightness and shortness of breath.    Cardiovascular:  Negative for chest pain, palpitations and leg swelling.   Gastrointestinal:  Negative for abdominal distention, abdominal pain, diarrhea, nausea, rectal pain and vomiting.   Genitourinary:  Negative for difficulty urinating, dysuria, flank pain, frequency, hematuria, pelvic pain and pelvic pressure.   Neurological:  Negative for dizziness, tremors, seizures, syncope, speech difficulty and confusion.   Psychiatric/Behavioral:  Positive for stress. Negative for agitation and behavioral problems.         Objective     Vital Signs:  /61 (BP Location: Left arm, Patient Position: Sitting, Cuff Size: Adult)   Pulse 85   Resp 18   Ht 165.1 cm (65\")   Wt 94.3 kg (208 lb)   SpO2 96%   BMI 34.61 kg/m²   Estimated body mass index is 34.61 kg/m² as calculated from the following:    Height as " "of this encounter: 165.1 cm (65\").    Weight as of this encounter: 94.3 kg (208 lb).    Physical Exam  Constitutional:       General: She is not in acute distress.     Appearance: Normal appearance. She is not ill-appearing, toxic-appearing or diaphoretic.   HENT:      Head: Normocephalic and atraumatic.      Nose: No congestion or rhinorrhea.      Mouth/Throat:      Pharynx: Oropharynx is clear. No oropharyngeal exudate or posterior oropharyngeal erythema.   Cardiovascular:      Rate and Rhythm: Normal rate and regular rhythm.      Heart sounds: No murmur heard.  Pulmonary:      Effort: No respiratory distress.      Breath sounds: No stridor. No wheezing, rhonchi or rales.   Chest:      Chest wall: No tenderness.   Abdominal:      General: There is no distension.      Tenderness: There is no abdominal tenderness. There is no right CVA tenderness, left CVA tenderness, guarding or rebound.   Musculoskeletal:         General: No swelling, tenderness or signs of injury.      Cervical back: No rigidity or tenderness.   Skin:     Coloration: Skin is not jaundiced or pale.      Findings: No bruising, erythema or rash.   Neurological:      General: No focal deficit present.      Mental Status: She is alert. Mental status is at baseline.   Psychiatric:         Mood and Affect: Mood normal.         Behavior: Behavior normal.          Result Review :  The following data was reviewed by Ankur Corea MD     Lab Results  Lab Results   Component Value Date    WBC 5.65 09/03/2024    HGB 12.4 09/03/2024    HCT 39.7 09/03/2024    MCV 87.4 09/03/2024     09/03/2024     Lab Results   Component Value Date    GLUCOSE 105 (H) 09/03/2024    BUN 7 (L) 09/03/2024    CREATININE 0.97 09/03/2024    EGFRIFNONA 56 (L) 03/08/2022    EGFRIFAFRI >60 03/08/2022    BCR 7.2 09/03/2024    K 4.7 09/03/2024    CO2 24.7 09/03/2024    CALCIUM 9.9 09/03/2024    ALBUMIN 4.3 09/03/2024    AST 16 09/03/2024    ALT 10 09/03/2024      Lab Results " "  Component Value Date    CRP <0.30 04/12/2024        No results found for: \"ACANTHNAEG\", \"AFBCX\", \"BPERTUSSISCX\", \"BLOODCX\"  No results found for: \"BCIDPCR\", \"CXREFLEX\", \"CSFCX\", \"CULTURETIS\"  No results found for: \"CULTURES\", \"HSVCX\", \"URCX\"  No results found for: \"EYECULTURE\", \"GCCX\", \"HSVCULTURE\", \"LABHSV\"  No results found for: \"LEGIONELLA\", \"MRSACX\", \"MUMPSCX\", \"MYCOPLASCX\"  No results found for: \"NOCARDIACX\", \"STOOLCX\"  No results found for: \"THROATCX\", \"UNSTIMCULT\", \"URINECX\", \"CULTURE\", \"VZVCULTUR\"  No results found for: \"VIRALCULTU\", \"WOUNDCX\"    Radiology Results              Assessment / Plan        Diagnoses and all orders for this visit:    1. Parasitic infection (Primary)  -     Alpha-Gal IgE Panel; Future  -     C-reactive Protein; Future  -     Sedimentation Rate; Future  -     Lyme Disease Total Antibody With Reflex to Immunoassay; Future    2. Lyme arthritis  -     Alpha-Gal IgE Panel; Future  -     C-reactive Protein; Future  -     Sedimentation Rate; Future  -     Lyme Disease Total Antibody With Reflex to Immunoassay; Future    3. Atopic neurodermatitis  -     Alpha-Gal IgE Panel; Future      Patient has seen Dr. Ferraro in the past for infectious disease with complete work up that was negative including stool O&P. She believes her parasites come out only at night from her stool and skin. He has taken several rounds of Doxycycline for Lyme arthritis as well as several rounds of Albendazole, Ivermectin and Nitrofurantoin.    The best that helped her with her parasites is doxycycline per patient.    Patient had a dozen of pictures on her phone that I reviewed with her.    Will check CRP, ESR and alpha-gal.              Follow Up   No follow-ups on file.    Visit Diagnoses:    ICD-10-CM ICD-9-CM   1. Parasitic infection  B89 134.9   2. Lyme arthritis  A69.23 088.81     711.80   3. Atopic neurodermatitis  L20.81 691.8       Patient was given instructions and counseling regarding her condition or for " health maintenance advice. Please see specific information pulled into the AVS if appropriate.     This document has been electronically signed by Ankur Corea MD   November 7, 2024 12:01 EST      No orders of the defined types were placed in this encounter.     Dictated Utilizing Dragon Dictation: Part of this note may be an electronic transcription/translation of spoken language to printed text using the Dragon Dictation System.

## 2024-11-08 LAB — B BURGDOR IGG+IGM SER QL IA: NEGATIVE

## 2024-11-13 LAB
ALPHA-GAL IGE QN: 7.12 KU/L
BEEF IGE QN: 2.58 KU/L
CONV CLASS DESCRIPTION: ABNORMAL
IGE SERPL-ACNC: 745 IU/ML (ref 6–495)
LAMB IGE QN: 1.76 KU/L
PORK IGE QN: 0.99 KU/L

## 2024-11-25 ENCOUNTER — SPECIALTY PHARMACY (OUTPATIENT)
Age: 74
End: 2024-11-25
Payer: MEDICARE

## 2024-11-25 ENCOUNTER — OFFICE VISIT (OUTPATIENT)
Dept: INFECTIOUS DISEASES | Facility: CLINIC | Age: 74
End: 2024-11-25
Payer: MEDICARE

## 2024-11-25 VITALS
HEIGHT: 65 IN | OXYGEN SATURATION: 99 % | WEIGHT: 205 LBS | DIASTOLIC BLOOD PRESSURE: 80 MMHG | RESPIRATION RATE: 18 BRPM | BODY MASS INDEX: 34.16 KG/M2 | SYSTOLIC BLOOD PRESSURE: 132 MMHG

## 2024-11-25 DIAGNOSIS — B89 PARASITIC INFECTION: ICD-10-CM

## 2024-11-25 DIAGNOSIS — M35.3 POLYMYALGIA RHEUMATICA: ICD-10-CM

## 2024-11-25 DIAGNOSIS — T78.1XXA ALLERGIC REACTION TO ALPHA-GAL: ICD-10-CM

## 2024-11-25 DIAGNOSIS — R76.8 POSITIVE LYME DISEASE SEROLOGY: Primary | ICD-10-CM

## 2024-11-25 NOTE — PROGRESS NOTES
"[facility]         Referring Provider: No referring provider defined for this encounter.    Subjective      Chief Complaint  Follow-up (Parasitusis)    History of Present Illness  Lorenza David is a 74 y.o. female who presents today to [facility] for Follow Up .    A new referral to me and this is what I got from her records: \"Patient called stating that she has a \"parasite.\" She states they are coming out of her nose and that she scratched a spot on her stomach, it bled, and a worm crawled out. I advised several times that she should proceed to ED but she states she has been to several ED's as well as her PCP and infectious disease. She states they have all done nothing for her and won't even look at samples she brings in. Again, I did advise the best thing to do was proceed to ED but she insisted I ask you what do to. She wanted to know if she could get in for MyChart visit but I don't know what we would do for her even if she came in the office for appointment.\"     Currently patient had been seeking help to treat her parasites there are supposedly coming out of her skin and causing her a lot of distress.  Was recently been treated for Lyme arthritis as well.    Past Medical History:   Diagnosis Date    Anemia     Arthritis     Asthma     Colon polyp     Diabetes mellitus     Diverticulosis     Elbow tendinitis     Fatigue     Fatty liver     Fibromyalgia     GERD (gastroesophageal reflux disease)     Hernia     Hypertension     IBS (irritable bowel syndrome)     Migraine     Plantar fasciitis     Positive Lyme disease serology     Sleep apnea     Temporal arteritis     Tick bite     Type 2 diabetes mellitus     Vitamin D deficiency     Xerostomia        Past Surgical History:   Procedure Laterality Date    CATARACT EXTRACTION       SECTION      CHOLECYSTECTOMY      COLONOSCOPY      COLONOSCOPY N/A 2022    Procedure: COLONOSCOPY;  Surgeon: " Domitila Andersen MD;  Location: [facility] OR;  Service: Gastroenterology;  Laterality: N/A;    DILATATION AND CURETTAGE      ENDOSCOPY N/A 07/20/2022    Procedure: ESOPHAGOGASTRODUODENOSCOPY WITH BIOPSY;  Surgeon: Domitila Andersen MD;  Location: [facility] OR;  Service: Gastroenterology;  Laterality: N/A;    EYE SURGERY      HYSTERECTOMY  1992    benign       Social History     Socioeconomic History    Marital status:    Tobacco Use    Smoking status: Never    Smokeless tobacco: Never   Vaping Use    Vaping status: Never Used   Substance and Sexual Activity    Alcohol use: No    Drug use: No    Sexual activity: Defer       Family History  family history includes Diabetes in her mother; Esophageal cancer in her father.    Immunization History   Administered Date(s) Administered    COVID-19 (MODERNA) BIVALENT 12+YRS 11/30/2022    COVID-19 (PFIZER) Purple Cap Monovalent 02/23/2021, 03/16/2021    Fluzone High-Dose 65+YRS 10/01/2017        Allergies  Allergies   Allergen Reactions    Sulfa Antibiotics     Latex, Natural Rubber Rash     Other reaction(s): Hives       The medication list has been reviewed and updated.   Current Medications    Current Outpatient Medications:     amLODIPine (NORVASC) 5 MG tablet, Take 1 tablet by mouth Daily., Disp: , Rfl:     aspirin 81 MG chewable tablet, Chew 1 tablet Daily., Disp: , Rfl:     cefdinir (OMNICEF) 300 MG capsule, TAKE 1 CAPSULE BY MOUTH EVERY 12 HOURS FOR 7 DAYS FOR UTI, Disp: , Rfl:     clobetasol propionate (TEMOVATE) 0.05 % cream, APPLY 1 APPLICATION TOPICALLY TWICE DAILY FOR 7 DAYS, Disp: , Rfl:     dicyclomine (Bentyl) 10 MG capsule, Take 1 capsule by mouth 4 (Four) Times a Day Before Meals & at Bedtime., Disp: 120 capsule, Rfl: 0    doxycycline (MONODOX) 100 MG capsule, Take 1 capsule by mouth 2 (Two) Times a Day., Disp: 28 capsule, Rfl: 0    doxycycline (VIBRAMYCIN) 100 MG capsule, Take 1 capsule by mouth 2 (Two) Times a Day., Disp: 60 capsule,  Rfl: 0    DULoxetine (CYMBALTA) 20 MG capsule, Take 1 capsule by mouth Daily., Disp: , Rfl:     escitalopram (LEXAPRO) 20 MG tablet, Take 1.5 tablets by mouth Daily., Disp: , Rfl:     famotidine (PEPCID) 20 MG tablet, Take 1 tablet by mouth Every 12 (Twelve) Hours., Disp: , Rfl:     Farxiga 5 MG tablet tablet, Take 1 tablet by mouth Daily., Disp: , Rfl:     fluticasone (FLONASE) 50 MCG/ACT nasal spray, 2 sprays into the nostril(s) as directed by provider Daily. Administer 2 sprays in each nostril for each dose., Disp: 16 g, Rfl: 6    HYDROcodone-acetaminophen (NORCO) 7.5-325 MG per tablet, Take 1 tablet by mouth Every 8 (Eight) Hours As Needed (pain)., Disp: 9 tablet, Rfl: 0    hydrOXYzine (ATARAX) 25 MG tablet, Take 1 tablet by mouth Every 8 (Eight) Hours As Needed for Itching., Disp: 90 tablet, Rfl: 3    Hyoscyamine Sulfate SL (Levsin/SL) 0.125 MG sublingual tablet, Place 0.125 mg under the tongue Every 8 (Eight) Hours As Needed (abdominal pain)., Disp: 30 each, Rfl: 3    lansoprazole (PREVACID) 30 MG capsule, Take 1 capsule by mouth 2 (Two) Times a Day. Take one capsule 30 minutes prior to eating breakfast, Disp: 180 capsule, Rfl: 3    metFORMIN (GLUCOPHAGE) 500 MG tablet, Take 1 tablet by mouth 2 (Two) Times a Day With Meals., Disp: , Rfl:     mirtazapine (REMERON) 15 MG tablet, Take 1 tablet by mouth every night at bedtime., Disp: , Rfl:     Mounjaro 5 MG/0.5ML solution pen-injector pen, ADMINISTER 5 MG UNDER THE SKIN 1 TIME A WEEK, Disp: , Rfl:     nystatin (MYCOSTATIN) 488751 UNIT/GM powder, Apply  topically 2 (Two) Times a Day As Needed., Disp: , Rfl:     polyethylene glycol (MIRALAX) 17 GM/SCOOP powder, Take 17 g by mouth Daily., Disp: 527 g, Rfl: 11    predniSONE (DELTASONE) 5 MG tablet, Take 1 tablet by mouth Daily., Disp: , Rfl:     pregabalin (LYRICA) 100 MG capsule, Take 3 capsules by mouth 2 (Two) Times a Day., Disp: , Rfl:     rosuvastatin (CRESTOR) 5 MG tablet, Take 1 tablet by mouth every night at  "bedtime., Disp: , Rfl:     tiZANidine (ZANAFLEX) 4 MG tablet, Take 1 tablet by mouth 3 times a day., Disp: , Rfl:     Tocilizumab 162 MG/0.9ML solution prefilled syringe, Inject 0.9 mL under the skin into the appropriate area as directed 1 (One) Time Per Week., Disp: , Rfl:     traMADol (ULTRAM) 50 MG tablet, Take 1 tablet by mouth Every 12 (Twelve) Hours., Disp: , Rfl:     vitamin D (ERGOCALCIFEROL) 1.25 MG (66907 UT) capsule capsule, Take 1 capsule by mouth 1 (One) Time Per Week., Disp: , Rfl:     Xiidra 5 % ophthalmic solution, INSTILL ONE DROP INTO BOTH EYES TWICE DAILY, Disp: , Rfl:       Review of Systems    Review of Systems   Constitutional:  Negative for activity change, appetite change, chills, diaphoresis, fatigue and fever.   HENT:  Negative for congestion, dental problem, drooling, ear discharge, ear pain, facial swelling, postnasal drip, sinus pressure, sore throat and swollen glands.    Eyes:  Negative for blurred vision, double vision, pain, discharge and itching.   Respiratory:  Negative for apnea, cough, choking, chest tightness and shortness of breath.    Cardiovascular:  Negative for chest pain, palpitations and leg swelling.   Gastrointestinal:  Negative for abdominal distention, abdominal pain, diarrhea, nausea, rectal pain and vomiting.   Genitourinary:  Negative for difficulty urinating, dysuria, flank pain, frequency, hematuria, pelvic pain and pelvic pressure.   Neurological:  Negative for dizziness, tremors, seizures, syncope, speech difficulty and confusion.   Psychiatric/Behavioral:  Negative for agitation and behavioral problems.         Objective     Vital Signs:  /80 (BP Location: Right arm, Patient Position: Sitting, Cuff Size: Adult)   Resp 18   Ht 165.1 cm (65\")   Wt 93 kg (205 lb)   SpO2 99%   BMI 34.11 kg/m²   Estimated body mass index is 34.11 kg/m² as calculated from the following:    Height as of this encounter: 165.1 cm (65\").    Weight as of this encounter: 93 kg " (205 lb).    Physical Exam  Constitutional:       General: She is not in acute distress.     Appearance: Normal appearance. She is not ill-appearing, toxic-appearing or diaphoretic.   HENT:      Head: Normocephalic and atraumatic.      Nose: No congestion or rhinorrhea.      Mouth/Throat:      Pharynx: Oropharynx is clear. No oropharyngeal exudate or posterior oropharyngeal erythema.   Cardiovascular:      Rate and Rhythm: Normal rate and regular rhythm.      Heart sounds: No murmur heard.  Pulmonary:      Effort: No respiratory distress.      Breath sounds: No stridor. No wheezing, rhonchi or rales.   Chest:      Chest wall: No tenderness.   Abdominal:      General: There is no distension.      Tenderness: There is no abdominal tenderness. There is no right CVA tenderness, left CVA tenderness, guarding or rebound.   Musculoskeletal:         General: No swelling, tenderness or signs of injury.      Cervical back: No rigidity or tenderness.   Skin:     Coloration: Skin is not jaundiced or pale.      Findings: No bruising, erythema or rash.   Neurological:      General: No focal deficit present.      Mental Status: She is alert. Mental status is at baseline.   Psychiatric:         Mood and Affect: Mood normal.         Behavior: Behavior normal.          Result Review :  The following data was reviewed by Ankur Corea MD     Lab Results  Lab Results   Component Value Date    WBC 5.65 09/03/2024    HGB 12.4 09/03/2024    HCT 39.7 09/03/2024    MCV 87.4 09/03/2024     09/03/2024     Lab Results   Component Value Date    GLUCOSE 105 (H) 09/03/2024    BUN 7 (L) 09/03/2024    CREATININE 0.97 09/03/2024    EGFRIFNONA 56 (L) 03/08/2022    EGFRIFAFRI >60 03/08/2022    BCR 7.2 09/03/2024    K 4.7 09/03/2024    CO2 24.7 09/03/2024    CALCIUM 9.9 09/03/2024    ALBUMIN 4.3 09/03/2024    AST 16 09/03/2024    ALT 10 09/03/2024      Lab Results   Component Value Date    CRP <0.30 11/07/2024        No results found for:  "\"ACANTHNAEG\", \"AFBCX\", \"BPERTUSSISCX\", \"BLOODCX\"  No results found for: \"BCIDPCR\", \"CXREFLEX\", \"CSFCX\", \"CULTURETIS\"  No results found for: \"CULTURES\", \"HSVCX\", \"URCX\"  No results found for: \"EYECULTURE\", \"GCCX\", \"HSVCULTURE\", \"LABHSV\"  No results found for: \"LEGIONELLA\", \"MRSACX\", \"MUMPSCX\", \"MYCOPLASCX\"  No results found for: \"NOCARDIACX\", \"STOOLCX\"  No results found for: \"THROATCX\", \"UNSTIMCULT\", \"URINECX\", \"CULTURE\", \"VZVCULTUR\"  No results found for: \"VIRALCULTU\", \"WOUNDCX\"    Radiology Results              Assessment / Plan        Diagnoses and all orders for this visit:    1. Positive Lyme disease serology (Primary)    2. Polymyalgia rheumatica    3. Parasitic infection    4. Allergic reaction to alpha-gal      Patient has seen Dr. Ferraro in the past for infectious disease with complete work up that was negative including stool O&P. She believes her parasites come out only at night from her stool and skin. He has taken several rounds of Doxycycline for Lyme arthritis as well as several rounds of Albendazole, Ivermectin and Nitrofurantoin.     Patient had a dozen of pictures on her phone that I reviewed with her.    I reviewed patient's blood work and her CRP and ESR were fairly normal but she has a very elevated alpha gal IgE profile mainly for beef but for lamb and pork as well.  This could explain some of her GI and skin complaints that she has been dealing with for several months now with the constant itching intermittent diarrhea and cramps and rashes.    I discussed the finding with the patient explained to her the disease physiology and agreed on a dietary restriction regimen for the next 3 months and recheck levels in 3 months.    Patient was diagnosed with Giardiasis by [facility] and was given 2 course of metronidazole without any relief.          Follow Up   Return in about 12 weeks (around 2/17/2025) for Follow up, With Dr. Corea.    Visit Diagnoses:    ICD-10-CM ICD-9-CM   1. Positive Lyme disease " serology  R76.8 795.79   2. Polymyalgia rheumatica  M35.3 725   3. Parasitic infection  B89 134.9   4. Allergic reaction to alpha-gal  T78.1XXA 693.1       Patient was given instructions and counseling regarding her condition or for health maintenance advice. Please see specific information pulled into the AVS if appropriate.     This document has been electronically signed by Ankur Corea MD   November 25, 2024 11:57 EST      No orders of the defined types were placed in this encounter.     Dictated Utilizing Dragon Dictation: Part of this note may be an electronic transcription/translation of spoken language to printed text using the Dragon Dictation System.

## 2024-12-02 ENCOUNTER — TRANSCRIBE ORDERS (OUTPATIENT)
Dept: ADMINISTRATIVE | Facility: HOSPITAL | Age: 74
End: 2024-12-02
Payer: MEDICARE

## 2024-12-02 DIAGNOSIS — R10.84 GENERALIZED ABDOMINAL PAIN: Primary | ICD-10-CM

## 2024-12-02 DIAGNOSIS — R10.2 PELVIC AND PERINEAL PAIN: ICD-10-CM

## 2024-12-06 ENCOUNTER — HOSPITAL ENCOUNTER (OUTPATIENT)
Facility: HOSPITAL | Age: 74
Discharge: HOME | End: 2024-12-06
Payer: MEDICARE

## 2024-12-06 DIAGNOSIS — R10.84 GENERALIZED ABDOMINAL PAIN: ICD-10-CM

## 2024-12-06 DIAGNOSIS — R10.2 PELVIC AND PERINEAL PAIN: ICD-10-CM

## 2024-12-06 PROCEDURE — 76856 US EXAM PELVIC COMPLETE: CPT

## 2024-12-06 PROCEDURE — 76700 US EXAM ABDOM COMPLETE: CPT

## 2024-12-06 PROCEDURE — 76700 US EXAM ABDOM COMPLETE: CPT | Performed by: RADIOLOGY

## 2025-02-25 ENCOUNTER — TELEPHONE (OUTPATIENT)
Age: 75
End: 2025-02-25
Payer: MEDICARE

## 2025-02-25 ENCOUNTER — OFFICE VISIT (OUTPATIENT)
Dept: INFECTIOUS DISEASES | Facility: CLINIC | Age: 75
End: 2025-02-25
Payer: MEDICARE

## 2025-02-25 VITALS
SYSTOLIC BLOOD PRESSURE: 120 MMHG | HEART RATE: 77 BPM | OXYGEN SATURATION: 96 % | WEIGHT: 209 LBS | DIASTOLIC BLOOD PRESSURE: 66 MMHG | BODY MASS INDEX: 34.78 KG/M2

## 2025-02-25 DIAGNOSIS — R76.8 POSITIVE LYME DISEASE SEROLOGY: ICD-10-CM

## 2025-02-25 DIAGNOSIS — M31.6 TEMPORAL ARTERITIS: ICD-10-CM

## 2025-02-25 DIAGNOSIS — B89 PARASITIC INFECTION: ICD-10-CM

## 2025-02-25 DIAGNOSIS — J45.991 COUGH VARIANT ASTHMA: ICD-10-CM

## 2025-02-25 DIAGNOSIS — M35.3 POLYMYALGIA RHEUMATICA: ICD-10-CM

## 2025-02-25 DIAGNOSIS — A69.23 LYME ARTHRITIS: ICD-10-CM

## 2025-02-25 DIAGNOSIS — T78.1XXA ALLERGIC REACTION TO ALPHA-GAL: Primary | ICD-10-CM

## 2025-02-25 PROCEDURE — 86003 ALLG SPEC IGE CRUDE XTRC EA: CPT | Performed by: INTERNAL MEDICINE

## 2025-02-25 PROCEDURE — 86008 ALLG SPEC IGE RECOMB EA: CPT | Performed by: INTERNAL MEDICINE

## 2025-02-25 PROCEDURE — 99214 OFFICE O/P EST MOD 30 MIN: CPT | Performed by: INTERNAL MEDICINE

## 2025-02-25 PROCEDURE — 82785 ASSAY OF IGE: CPT | Performed by: INTERNAL MEDICINE

## 2025-02-25 RX ORDER — IVERMECTIN 3 MG/1
3 TABLET ORAL DAILY
COMMUNITY
End: 2025-02-25

## 2025-02-25 NOTE — TELEPHONE ENCOUNTER
PT APPT HAD TO BE CANCELLED. IF PT CALLS BACK TO RESCHEDULE, PLEASE SCHEDULE WITH JOSÉ MIGUEL SHABAZZ OR SHWETA. IF NOT ALREADY ON, PLEASE ADD PT TO THE CANCELLATION LIST AS NORMAL PRIOTIRY WITH AN END DATE OF 12/31/2025.   -HUB READY TO SCHEDULE.

## 2025-02-25 NOTE — PROGRESS NOTES
"[facility]         Referring Provider: No referring provider defined for this encounter.    Subjective      Chief Complaint  Follow-up (Lyme Disease)    History of Present Illness  Lorenza David is a 74 y.o. female who presents today to [facility] for infectious disease evaluation and antibiotic management.    \"Patient called stating that she has a \"parasite.\" She states they are coming out of her nose and that she scratched a spot on her stomach, it bled, and a worm crawled out. I advised several times that she should proceed to ED but she states she has been to several ED's as well as her PCP and infectious disease. She states they have all done nothing for her and won't even look at samples she brings in. Again, I did advise the best thing to do was proceed to ED but she insisted I ask you what do to. She wanted to know if she could get in for MyChart visit but I don't know what we would do for her even if she came in the office for appointment.\"     Currently patient had been seeking help to treat her parasites there are supposedly coming out of her skin and causing her a lot of distress.  Was recently been treated for Lyme arthritis as well.    Was found to have a positive alphagal profile.      Past Medical History:   Diagnosis Date    Anemia     Arthritis     Asthma     Colon polyp     Diabetes mellitus     Diverticulosis     Elbow tendinitis     Fatigue     Fatty liver     Fibromyalgia     GERD (gastroesophageal reflux disease)     Hernia     Hypertension     IBS (irritable bowel syndrome)     Migraine     Plantar fasciitis     Positive Lyme disease serology     Sleep apnea     Temporal arteritis     Tick bite     Type 2 diabetes mellitus     Vitamin D deficiency     Xerostomia        Past Surgical History:   Procedure Laterality Date    CATARACT EXTRACTION       SECTION      CHOLECYSTECTOMY      COLONOSCOPY      COLONOSCOPY N/A 2022    " Procedure: COLONOSCOPY;  Surgeon: Domitila Andersen MD;  Location: [facility] OR;  Service: Gastroenterology;  Laterality: N/A;    DILATATION AND CURETTAGE      ENDOSCOPY N/A 07/20/2022    Procedure: ESOPHAGOGASTRODUODENOSCOPY WITH BIOPSY;  Surgeon: Domitila Andersen MD;  Location: [facility] OR;  Service: Gastroenterology;  Laterality: N/A;    EYE SURGERY      HYSTERECTOMY  1992    benign       Social History     Socioeconomic History    Marital status:    Tobacco Use    Smoking status: Never    Smokeless tobacco: Never   Vaping Use    Vaping status: Never Used   Substance and Sexual Activity    Alcohol use: No    Drug use: No    Sexual activity: Defer       Family History  family history includes Diabetes in her mother; Esophageal cancer in her father.    Immunization History   Administered Date(s) Administered    COVID-19 (MODERNA) BIVALENT 12+YRS 11/30/2022    COVID-19 (PFIZER) Purple Cap Monovalent 02/23/2021, 03/16/2021    Fluzone High-Dose 65+YRS 10/01/2017        Allergies  Allergies   Allergen Reactions    Sulfa Antibiotics     Latex, Natural Rubber Rash     Other reaction(s): Hives       The medication list has been reviewed and updated.   Current Medications    Current Outpatient Medications:     amLODIPine (NORVASC) 5 MG tablet, Take 1 tablet by mouth Daily., Disp: , Rfl:     aspirin 81 MG chewable tablet, Chew 1 tablet Daily., Disp: , Rfl:     DULoxetine (CYMBALTA) 20 MG capsule, Take 1 capsule by mouth Daily., Disp: , Rfl:     famotidine (PEPCID) 20 MG tablet, Take 1 tablet by mouth Every 12 (Twelve) Hours., Disp: , Rfl:     Farxiga 5 MG tablet tablet, Take 1 tablet by mouth Daily., Disp: , Rfl:     HYDROcodone-acetaminophen (NORCO) 7.5-325 MG per tablet, Take 1 tablet by mouth Every 8 (Eight) Hours As Needed (pain)., Disp: 9 tablet, Rfl: 0    lansoprazole (PREVACID) 30 MG capsule, Take 1 capsule by mouth 2 (Two) Times a Day. Take one capsule 30 minutes prior to eating breakfast,  "Disp: 180 capsule, Rfl: 3    mirtazapine (REMERON) 15 MG tablet, Take 1 tablet by mouth every night at bedtime., Disp: , Rfl:     pregabalin (LYRICA) 100 MG capsule, Take 1 capsule by mouth Daily., Disp: , Rfl:     rosuvastatin (CRESTOR) 5 MG tablet, Take 1 tablet by mouth every night at bedtime., Disp: , Rfl:     tiZANidine (ZANAFLEX) 4 MG tablet, Take 1 tablet by mouth 3 times a day., Disp: , Rfl:     Xiidra 5 % ophthalmic solution, INSTILL ONE DROP INTO BOTH EYES TWICE DAILY, Disp: , Rfl:       Review of Systems    Review of Systems   Constitutional:  Negative for activity change, appetite change, chills, diaphoresis, fatigue and fever.   HENT:  Negative for congestion, dental problem, drooling, ear discharge, ear pain, facial swelling, postnasal drip, sinus pressure, sore throat and swollen glands.    Eyes:  Negative for blurred vision, double vision, pain, discharge and itching.   Respiratory:  Negative for apnea, cough, choking, chest tightness and shortness of breath.    Cardiovascular:  Negative for chest pain, palpitations and leg swelling.   Gastrointestinal:  Negative for abdominal distention, abdominal pain, diarrhea, nausea, rectal pain and vomiting.   Genitourinary:  Negative for difficulty urinating, dysuria, flank pain, frequency, hematuria, pelvic pain and pelvic pressure.   Skin:  Positive for dry skin.   Neurological:  Negative for dizziness, tremors, seizures, syncope, speech difficulty and confusion.   Psychiatric/Behavioral:  Negative for agitation and behavioral problems.         Objective     Vital Signs:  /66 (BP Location: Right arm, Patient Position: Sitting, Cuff Size: Adult)   Pulse 77   Wt 94.8 kg (209 lb)   SpO2 96%   BMI 34.78 kg/m²   Estimated body mass index is 34.78 kg/m² as calculated from the following:    Height as of 11/25/24: 165.1 cm (65\").    Weight as of this encounter: 94.8 kg (209 lb).    Physical Exam  Constitutional:       General: She is not in acute " "distress.     Appearance: Normal appearance. She is not ill-appearing, toxic-appearing or diaphoretic.   HENT:      Head: Normocephalic and atraumatic.      Nose: No congestion or rhinorrhea.      Mouth/Throat:      Pharynx: Oropharynx is clear. No oropharyngeal exudate or posterior oropharyngeal erythema.   Cardiovascular:      Rate and Rhythm: Normal rate and regular rhythm.      Heart sounds: No murmur heard.  Pulmonary:      Effort: No respiratory distress.      Breath sounds: No stridor. No wheezing, rhonchi or rales.   Chest:      Chest wall: No tenderness.   Abdominal:      General: There is no distension.      Tenderness: There is no abdominal tenderness. There is no right CVA tenderness, left CVA tenderness, guarding or rebound.   Musculoskeletal:         General: No swelling, tenderness or signs of injury.      Cervical back: No rigidity or tenderness.   Skin:     Coloration: Skin is not jaundiced or pale.      Findings: No bruising, erythema or rash.   Neurological:      General: No focal deficit present.      Mental Status: She is alert. Mental status is at baseline.   Psychiatric:         Mood and Affect: Mood normal.         Behavior: Behavior normal.          Result Review :  The following data was reviewed by Ankur Corea MD     Lab Results  Lab Results   Component Value Date    WBC 5.65 09/03/2024    HGB 12.4 09/03/2024    HCT 39.7 09/03/2024    MCV 87.4 09/03/2024     09/03/2024     Lab Results   Component Value Date    GLUCOSE 105 (H) 09/03/2024    BUN 7 (L) 09/03/2024    CREATININE 0.97 09/03/2024    EGFRIFNONA 56 (L) 03/08/2022    EGFRIFAFRI >60 03/08/2022    BCR 7.2 09/03/2024    K 4.7 09/03/2024    CO2 24.7 09/03/2024    CALCIUM 9.9 09/03/2024    ALBUMIN 4.3 09/03/2024    AST 16 09/03/2024    ALT 10 09/03/2024      Lab Results   Component Value Date    CRP <0.30 11/07/2024        No results found for: \"ACANTHNAEG\", \"AFBCX\", \"BPERTUSSISCX\", \"BLOODCX\"  No results found for: " "\"BCIDPCR\", \"CXREFLEX\", \"CSFCX\", \"CULTURETIS\"  No results found for: \"CULTURES\", \"HSVCX\", \"URCX\"  No results found for: \"EYECULTURE\", \"GCCX\", \"HSVCULTURE\", \"LABHSV\"  No results found for: \"LEGIONELLA\", \"MRSACX\", \"MUMPSCX\", \"MYCOPLASCX\"  No results found for: \"NOCARDIACX\", \"STOOLCX\"  No results found for: \"THROATCX\", \"UNSTIMCULT\", \"URINECX\", \"CULTURE\", \"VZVCULTUR\"  No results found for: \"VIRALCULTU\", \"WOUNDCX\"    Radiology Results              Assessment / Plan        Diagnoses and all orders for this visit:    1. Allergic reaction to alpha-gal (Primary)  -     Alpha-Gal IgE Panel; Future    2. Lyme arthritis  -     Alpha-Gal IgE Panel; Future    3. Polymyalgia rheumatica  -     Alpha-Gal IgE Panel; Future    4. Positive Lyme disease serology  -     Alpha-Gal IgE Panel; Future    5. Temporal arteritis  -     Alpha-Gal IgE Panel; Future    6. Parasitic infection  -     Alpha-Gal IgE Panel; Future    7. Cough variant asthma  -     Alpha-Gal IgE Panel; Future        Patient has seen Dr. Ferraro in the past for infectious disease with complete work up that was negative including stool O&P. She believes her parasites come out only at night from her stool and skin. He has taken several rounds of Doxycycline for Lyme arthritis as well as several rounds of Albendazole, Ivermectin and Nitrofurantoin.     Patient had a dozen of pictures on her phone that I reviewed with her.     I reviewed patient's blood work and her CRP and ESR were fairly normal but she has a very elevated alpha gal IgE profile mainly for beef but for lamb and pork as well.  This could explain some of her GI and skin complaints that she has been dealing with for several months now with the constant itching intermittent diarrhea and cramps and rashes.     I discussed the finding with the patient explained to her the disease physiology and agreed on a dietary restriction regimen for the next 3 months and recheck levels in 3 months.     Patient was diagnosed " with Giardiasis by [facility] ED and was given 2 course of metronidazole without any relief.    On 11/7/25 Alpha gal IgE      IgE  6 - 495 IU/mL 745 High    Pork  Class II kU/L 0.99 Abnormal    Beef  Class III kU/L 2.58 Abnormal    Lamb  Class III kU/L 1.76 Abnormal    [id number] Alpha-Gal  Class IV kU/L 7.12 Abnormal            Follow Up   Return in about 3 months (around 5/25/2025) for Follow up, With Dr. Corea.    Visit Diagnoses:    ICD-10-CM ICD-9-CM   1. Allergic reaction to alpha-gal  T78.1XXA 693.1   2. Lyme arthritis  A69.23 088.81     711.80   3. Polymyalgia rheumatica  M35.3 725   4. Positive Lyme disease serology  R76.8 795.79   5. Temporal arteritis  M31.6 446.5   6. Parasitic infection  B89 134.9   7. Cough variant asthma  J45.991 493.82       Patient was given instructions and counseling regarding her condition or for health maintenance advice. Please see specific information pulled into the AVS if appropriate.     This document has been electronically signed by Ankur Corea MD   February 25, 2025 10:46 EST      No orders of the defined types were placed in this encounter.     Dictated Utilizing Dragon Dictation: Part of this note may be an electronic transcription/translation of spoken language to printed text using the Dragon Dictation System.

## 2025-03-04 LAB
ALPHA-GAL IGE QN: 3.11 KU/L
BEEF IGE QN: 1.41 KU/L
CONV CLASS DESCRIPTION: ABNORMAL
IGE SERPL-ACNC: 480 IU/ML (ref 6–495)
LAMB IGE QN: 0.32 KU/L
PORK IGE QN: 0.56 KU/L

## 2025-04-29 ENCOUNTER — TRANSCRIBE ORDERS (OUTPATIENT)
Dept: ADMINISTRATIVE | Facility: HOSPITAL | Age: 75
End: 2025-04-29
Payer: MEDICARE

## 2025-04-29 DIAGNOSIS — Z12.31 VISIT FOR SCREENING MAMMOGRAM: Primary | ICD-10-CM

## 2025-05-12 ENCOUNTER — HOSPITAL ENCOUNTER (OUTPATIENT)
Dept: MAMMOGRAPHY | Facility: HOSPITAL | Age: 75
Discharge: HOME | End: 2025-05-12
Admitting: INTERNAL MEDICINE
Payer: MEDICARE

## 2025-05-12 DIAGNOSIS — Z12.31 VISIT FOR SCREENING MAMMOGRAM: ICD-10-CM

## 2025-05-12 PROCEDURE — 77067 SCR MAMMO BI INCL CAD: CPT

## 2025-05-12 PROCEDURE — 77067 SCR MAMMO BI INCL CAD: CPT | Performed by: RADIOLOGY

## 2025-05-12 PROCEDURE — 77063 BREAST TOMOSYNTHESIS BI: CPT | Performed by: RADIOLOGY

## 2025-05-12 PROCEDURE — 77063 BREAST TOMOSYNTHESIS BI: CPT

## 2025-05-22 ENCOUNTER — HOSPITAL ENCOUNTER (EMERGENCY)
Facility: HOSPITAL | Age: 75
Discharge: HOME | End: 2025-05-22
Attending: STUDENT IN AN ORGANIZED HEALTH CARE EDUCATION/TRAINING PROGRAM
Payer: MEDICARE

## 2025-05-22 ENCOUNTER — APPOINTMENT (OUTPATIENT)
Dept: GENERAL RADIOLOGY | Facility: HOSPITAL | Age: 75
End: 2025-05-22
Payer: MEDICARE

## 2025-05-22 VITALS
HEIGHT: 65 IN | OXYGEN SATURATION: 97 % | SYSTOLIC BLOOD PRESSURE: 137 MMHG | WEIGHT: 200 LBS | BODY MASS INDEX: 33.32 KG/M2 | HEART RATE: 59 BPM | RESPIRATION RATE: 20 BRPM | TEMPERATURE: 98.9 F | DIASTOLIC BLOOD PRESSURE: 66 MMHG

## 2025-05-22 DIAGNOSIS — G89.4 CHRONIC PAIN SYNDROME: Primary | ICD-10-CM

## 2025-05-22 LAB
ALBUMIN SERPL-MCNC: 3.9 G/DL (ref 3.5–5.2)
ALBUMIN/GLOB SERPL: 1.4 G/DL
ALP SERPL-CCNC: 73 U/L (ref 39–117)
ALT SERPL W P-5'-P-CCNC: 9 U/L (ref 1–33)
ANION GAP SERPL CALCULATED.3IONS-SCNC: 9.5 MMOL/L (ref 5–15)
AST SERPL-CCNC: 15 U/L (ref 1–32)
BASOPHILS # BLD AUTO: 0.07 10*3/MM3 (ref 0–0.2)
BASOPHILS NFR BLD AUTO: 1.3 % (ref 0–1.5)
BILIRUB SERPL-MCNC: 0.3 MG/DL (ref 0–1.2)
BUN SERPL-MCNC: 5 MG/DL (ref 8–23)
BUN/CREAT SERPL: 5.7 (ref 7–25)
CALCIUM SPEC-SCNC: 9 MG/DL (ref 8.6–10.5)
CHLORIDE SERPL-SCNC: 104 MMOL/L (ref 98–107)
CK SERPL-CCNC: 70 U/L (ref 20–180)
CO2 SERPL-SCNC: 24.5 MMOL/L (ref 22–29)
CREAT SERPL-MCNC: 0.88 MG/DL (ref 0.57–1)
CRP SERPL-MCNC: <0.3 MG/DL (ref 0–0.5)
D-LACTATE SERPL-SCNC: 1.3 MMOL/L (ref 0.5–2)
DEPRECATED RDW RBC AUTO: 50.1 FL (ref 37–54)
EGFRCR SERPLBLD CKD-EPI 2021: 69.9 ML/MIN/1.73
EOSINOPHIL # BLD AUTO: 0.2 10*3/MM3 (ref 0–0.4)
EOSINOPHIL NFR BLD AUTO: 3.7 % (ref 0.3–6.2)
ERYTHROCYTE [DISTWIDTH] IN BLOOD BY AUTOMATED COUNT: 15.4 % (ref 12.3–15.4)
ERYTHROCYTE [SEDIMENTATION RATE] IN BLOOD: 15 MM/HR (ref 0–30)
GLOBULIN UR ELPH-MCNC: 2.8 GM/DL
GLUCOSE SERPL-MCNC: 92 MG/DL (ref 65–99)
HCT VFR BLD AUTO: 39.8 % (ref 34–46.6)
HGB BLD-MCNC: 11.9 G/DL (ref 12–15.9)
IMM GRANULOCYTES # BLD AUTO: 0.01 10*3/MM3 (ref 0–0.05)
IMM GRANULOCYTES NFR BLD AUTO: 0.2 % (ref 0–0.5)
INR PPP: 0.92 (ref 0.9–1.1)
LYMPHOCYTES # BLD AUTO: 1.38 10*3/MM3 (ref 0.7–3.1)
LYMPHOCYTES NFR BLD AUTO: 25.3 % (ref 19.6–45.3)
MAGNESIUM SERPL-MCNC: 2.1 MG/DL (ref 1.6–2.4)
MCH RBC QN AUTO: 26.3 PG (ref 26.6–33)
MCHC RBC AUTO-ENTMCNC: 29.9 G/DL (ref 31.5–35.7)
MCV RBC AUTO: 88.1 FL (ref 79–97)
MONOCYTES # BLD AUTO: 0.38 10*3/MM3 (ref 0.1–0.9)
MONOCYTES NFR BLD AUTO: 7 % (ref 5–12)
NEUTROPHILS NFR BLD AUTO: 3.42 10*3/MM3 (ref 1.7–7)
NEUTROPHILS NFR BLD AUTO: 62.5 % (ref 42.7–76)
NRBC BLD AUTO-RTO: 0 /100 WBC (ref 0–0.2)
PLATELET # BLD AUTO: 337 10*3/MM3 (ref 140–450)
PMV BLD AUTO: 8.9 FL (ref 6–12)
POTASSIUM SERPL-SCNC: 4.8 MMOL/L (ref 3.5–5.2)
PROT SERPL-MCNC: 6.7 G/DL (ref 6–8.5)
PROTHROMBIN TIME: 12.9 SECONDS (ref 12.5–15.2)
RBC # BLD AUTO: 4.52 10*6/MM3 (ref 3.77–5.28)
SODIUM SERPL-SCNC: 138 MMOL/L (ref 136–145)
TSH SERPL DL<=0.05 MIU/L-ACNC: 1.09 UIU/ML (ref 0.27–4.2)
WBC NRBC COR # BLD AUTO: 5.46 10*3/MM3 (ref 3.4–10.8)

## 2025-05-22 PROCEDURE — 83605 ASSAY OF LACTIC ACID: CPT | Performed by: STUDENT IN AN ORGANIZED HEALTH CARE EDUCATION/TRAINING PROGRAM

## 2025-05-22 PROCEDURE — 83735 ASSAY OF MAGNESIUM: CPT | Performed by: STUDENT IN AN ORGANIZED HEALTH CARE EDUCATION/TRAINING PROGRAM

## 2025-05-22 PROCEDURE — 85610 PROTHROMBIN TIME: CPT | Performed by: STUDENT IN AN ORGANIZED HEALTH CARE EDUCATION/TRAINING PROGRAM

## 2025-05-22 PROCEDURE — 36415 COLL VENOUS BLD VENIPUNCTURE: CPT

## 2025-05-22 PROCEDURE — 87468 ANAPLSMA PHGCYTOPHLM AMP PRB: CPT | Performed by: STUDENT IN AN ORGANIZED HEALTH CARE EDUCATION/TRAINING PROGRAM

## 2025-05-22 PROCEDURE — 99283 EMERGENCY DEPT VISIT LOW MDM: CPT

## 2025-05-22 PROCEDURE — 71045 X-RAY EXAM CHEST 1 VIEW: CPT | Performed by: RADIOLOGY

## 2025-05-22 PROCEDURE — 84443 ASSAY THYROID STIM HORMONE: CPT | Performed by: STUDENT IN AN ORGANIZED HEALTH CARE EDUCATION/TRAINING PROGRAM

## 2025-05-22 PROCEDURE — 86140 C-REACTIVE PROTEIN: CPT | Performed by: STUDENT IN AN ORGANIZED HEALTH CARE EDUCATION/TRAINING PROGRAM

## 2025-05-22 PROCEDURE — 85652 RBC SED RATE AUTOMATED: CPT | Performed by: STUDENT IN AN ORGANIZED HEALTH CARE EDUCATION/TRAINING PROGRAM

## 2025-05-22 PROCEDURE — 87484 EHRLICHA CHAFFEENSIS AMP PRB: CPT | Performed by: STUDENT IN AN ORGANIZED HEALTH CARE EDUCATION/TRAINING PROGRAM

## 2025-05-22 PROCEDURE — 87798 DETECT AGENT NOS DNA AMP: CPT | Performed by: STUDENT IN AN ORGANIZED HEALTH CARE EDUCATION/TRAINING PROGRAM

## 2025-05-22 PROCEDURE — 25010000002 KETOROLAC TROMETHAMINE PER 15 MG: Performed by: STUDENT IN AN ORGANIZED HEALTH CARE EDUCATION/TRAINING PROGRAM

## 2025-05-22 PROCEDURE — 96372 THER/PROPH/DIAG INJ SC/IM: CPT

## 2025-05-22 PROCEDURE — 82550 ASSAY OF CK (CPK): CPT | Performed by: STUDENT IN AN ORGANIZED HEALTH CARE EDUCATION/TRAINING PROGRAM

## 2025-05-22 PROCEDURE — 71045 X-RAY EXAM CHEST 1 VIEW: CPT

## 2025-05-22 PROCEDURE — 80053 COMPREHEN METABOLIC PANEL: CPT | Performed by: STUDENT IN AN ORGANIZED HEALTH CARE EDUCATION/TRAINING PROGRAM

## 2025-05-22 PROCEDURE — 85025 COMPLETE CBC W/AUTO DIFF WBC: CPT | Performed by: STUDENT IN AN ORGANIZED HEALTH CARE EDUCATION/TRAINING PROGRAM

## 2025-05-22 PROCEDURE — 86618 LYME DISEASE ANTIBODY: CPT | Performed by: STUDENT IN AN ORGANIZED HEALTH CARE EDUCATION/TRAINING PROGRAM

## 2025-05-22 PROCEDURE — 25010000002 DEXAMETHASONE PER 1 MG: Performed by: STUDENT IN AN ORGANIZED HEALTH CARE EDUCATION/TRAINING PROGRAM

## 2025-05-22 RX ORDER — DEXAMETHASONE SODIUM PHOSPHATE 10 MG/ML
10 INJECTION, SOLUTION INTRA-ARTICULAR; INTRALESIONAL; INTRAMUSCULAR; INTRAVENOUS; SOFT TISSUE ONCE
Status: DISCONTINUED | OUTPATIENT
Start: 2025-05-22 | End: 2025-05-22

## 2025-05-22 RX ORDER — KETOROLAC TROMETHAMINE 30 MG/ML
15 INJECTION, SOLUTION INTRAMUSCULAR; INTRAVENOUS ONCE
Status: DISCONTINUED | OUTPATIENT
Start: 2025-05-22 | End: 2025-05-22

## 2025-05-22 RX ORDER — TRAMADOL HYDROCHLORIDE 50 MG/1
50 TABLET ORAL EVERY 6 HOURS PRN
Qty: 15 TABLET | Refills: 0 | Status: SHIPPED | OUTPATIENT
Start: 2025-05-22

## 2025-05-22 RX ORDER — OXYCODONE AND ACETAMINOPHEN 5; 325 MG/1; MG/1
1 TABLET ORAL ONCE
Refills: 0 | Status: COMPLETED | OUTPATIENT
Start: 2025-05-22 | End: 2025-05-22

## 2025-05-22 RX ORDER — DOXYCYCLINE 100 MG/1
100 CAPSULE ORAL 2 TIMES DAILY
Qty: 28 CAPSULE | Refills: 0 | Status: SHIPPED | OUTPATIENT
Start: 2025-05-22 | End: 2025-06-05

## 2025-05-22 RX ORDER — DEXAMETHASONE SODIUM PHOSPHATE 10 MG/ML
10 INJECTION, SOLUTION INTRA-ARTICULAR; INTRALESIONAL; INTRAMUSCULAR; INTRAVENOUS; SOFT TISSUE ONCE
Status: COMPLETED | OUTPATIENT
Start: 2025-05-22 | End: 2025-05-22

## 2025-05-22 RX ORDER — DOXYCYCLINE 100 MG/1
100 CAPSULE ORAL ONCE
Status: COMPLETED | OUTPATIENT
Start: 2025-05-22 | End: 2025-05-22

## 2025-05-22 RX ORDER — KETOROLAC TROMETHAMINE 30 MG/ML
30 INJECTION, SOLUTION INTRAMUSCULAR; INTRAVENOUS ONCE
Status: COMPLETED | OUTPATIENT
Start: 2025-05-22 | End: 2025-05-22

## 2025-05-22 RX ORDER — DULOXETIN HYDROCHLORIDE 60 MG/1
60 CAPSULE, DELAYED RELEASE ORAL DAILY
Qty: 30 CAPSULE | Refills: 0 | Status: SHIPPED | OUTPATIENT
Start: 2025-05-22 | End: 2025-06-21

## 2025-05-22 RX ORDER — PREDNISONE 20 MG/1
40 TABLET ORAL DAILY
Qty: 10 TABLET | Refills: 0 | Status: SHIPPED | OUTPATIENT
Start: 2025-05-22 | End: 2025-05-27

## 2025-05-22 RX ADMIN — OXYCODONE HYDROCHLORIDE AND ACETAMINOPHEN 1 TABLET: 5; 325 TABLET ORAL at 17:28

## 2025-05-22 RX ADMIN — KETOROLAC TROMETHAMINE 30 MG: 60 INJECTION, SOLUTION INTRAMUSCULAR at 17:30

## 2025-05-22 RX ADMIN — DOXYCYCLINE 100 MG: 100 CAPSULE ORAL at 17:27

## 2025-05-22 RX ADMIN — DEXAMETHASONE SODIUM PHOSPHATE 10 MG: 10 INJECTION INTRAMUSCULAR; INTRAVENOUS at 17:28

## 2025-05-22 NOTE — ED PROVIDER NOTES
Subjective   History of Present Illness  Patient is a 74-year-old female with history significant for alpha gal, fibromyalgia who comes to the ER with pain all over.  She states she took a tick off for about a week ago.  She complains of pain in the joints of her hand and in her knees bilaterally.  She called the infectious disease office who told her to come to the ER for evaluation.  She reports subjective fevers and chills.  Has had some nausea and vomiting.  Denies any chest pain pressure or tightness.      Review of Systems   Constitutional:  Positive for chills, fatigue and fever.   HENT:  Negative for ear pain, sinus pain and sore throat.    Respiratory:  Negative for cough, chest tightness, shortness of breath and wheezing.    Cardiovascular:  Negative for chest pain, palpitations and leg swelling.   Gastrointestinal:  Negative for abdominal pain, constipation, diarrhea, nausea and vomiting.   Genitourinary:  Negative for dysuria, hematuria and urgency.   Musculoskeletal:  Positive for joint swelling and myalgias. Negative for arthralgias.   Neurological:  Positive for weakness. Negative for dizziness, syncope and light-headedness.   Psychiatric/Behavioral:  Negative for confusion.        Past Medical History:   Diagnosis Date    Anemia     Arthritis     Asthma     Colon polyp     Diabetes mellitus     Diverticulosis     Elbow tendinitis     Fatigue     Fatty liver     Fibromyalgia     GERD (gastroesophageal reflux disease)     Hernia     Hypertension     IBS (irritable bowel syndrome)     Migraine     Plantar fasciitis     Positive Lyme disease serology     Sleep apnea     Temporal arteritis     Tick bite     Type 2 diabetes mellitus     Vitamin D deficiency     Xerostomia        Allergies   Allergen Reactions    Sulfa Antibiotics     Latex, Natural Rubber Rash     Other reaction(s): Hives       Past Surgical History:   Procedure Laterality Date    CATARACT EXTRACTION       SECTION       CHOLECYSTECTOMY      COLONOSCOPY      COLONOSCOPY N/A 07/20/2022    Procedure: COLONOSCOPY;  Surgeon: Domitila Andersen MD;  Location:  [facility];  Service: Gastroenterology;  Laterality: N/A;    DILATATION AND CURETTAGE      ENDOSCOPY N/A 07/20/2022    Procedure: ESOPHAGOGASTRODUODENOSCOPY WITH BIOPSY;  Surgeon: Domitila Andersen MD;  Location:  [facility];  Service: Gastroenterology;  Laterality: N/A;    EYE SURGERY      HYSTERECTOMY  1992    benign       Family History   Problem Relation Age of Onset    Diabetes Mother     Esophageal cancer Father     Breast cancer Neg Hx        Social History     Socioeconomic History    Marital status:    Tobacco Use    Smoking status: Never    Smokeless tobacco: Never   Vaping Use    Vaping status: Never Used   Substance and Sexual Activity    Alcohol use: No    Drug use: No    Sexual activity: Defer           Objective   Physical Exam  Vitals and nursing note reviewed. Exam conducted with a chaperone present.   Constitutional:       Appearance: Normal appearance. She is normal weight.   HENT:      Head: Normocephalic and atraumatic.      Nose: Nose normal.      Mouth/Throat:      Mouth: Mucous membranes are moist.      Pharynx: Oropharynx is clear.   Eyes:      Extraocular Movements: Extraocular movements intact.      Conjunctiva/sclera: Conjunctivae normal.      Pupils: Pupils are equal, round, and reactive to light.   Cardiovascular:      Rate and Rhythm: Normal rate and regular rhythm.      Pulses: Normal pulses.      Heart sounds: Normal heart sounds.   Pulmonary:      Effort: Pulmonary effort is normal.      Breath sounds: Normal breath sounds.   Abdominal:      General: Bowel sounds are normal. There is no distension.      Palpations: Abdomen is soft.      Tenderness: There is no abdominal tenderness.   Musculoskeletal:         General: Normal range of motion.      Cervical back: Normal range of motion and neck supple.   Skin:     General: Skin is  warm and dry.      Capillary Refill: Capillary refill takes less than 2 seconds.   Neurological:      General: No focal deficit present.      Mental Status: She is alert.   Psychiatric:         Mood and Affect: Mood normal.         Behavior: Behavior normal.         Procedures           ED Course                                                       Medical Decision Making  --Patient is hemodynamically stable, labs unremarkable   --discussed with ID, does not need alpha gal or other labs ordered at this point  --Tick panel sent  --Will DC with as needed steroids, scheduled Tylenol and tramadol, follow-up outpatient    Amount and/or Complexity of Data Reviewed  Labs: ordered.  Radiology: ordered.    Risk  Prescription drug management.        Final diagnoses:   Chronic pain syndrome       ED Disposition  ED Disposition       ED Disposition   Discharge    Condition   Stable    Comment   --               Sabas Verde MD  [address] [facility]  [place] [facility] [address] [phone]    In 1 week           Medication List        New Prescriptions      doxycycline 100 MG capsule  Commonly known as: VIBRAMYCIN  Take 1 capsule by mouth 2 (Two) Times a Day for 14 days.     predniSONE 20 MG tablet  Commonly known as: DELTASONE  Take 2 tablets by mouth Daily for 5 days.     traMADol 50 MG tablet  Commonly known as: ULTRAM  Take 1 tablet by mouth Every 6 (Six) Hours As Needed for Severe Pain or Moderate Pain.            Changed      DULoxetine 60 MG capsule  Commonly known as: CYMBALTA  Take 1 capsule by mouth Daily for 30 days.  What changed:   medication strength  how much to take               Where to Get Your Medications        These medications were sent to [id number] - [address] [place] [facility] [address] [facility] - [phone]  - [phone] [address] [address]      Phone: [phone]   doxycycline 100 MG capsule  DULoxetine 60 MG  capsule  predniSONE 20 MG tablet  traMADol 50 MG tablet            Flex Stevenson, DO  05/22/25 [id number]

## 2025-05-23 ENCOUNTER — OFFICE VISIT (OUTPATIENT)
Dept: INFECTIOUS DISEASES | Facility: CLINIC | Age: 75
End: 2025-05-23
Payer: MEDICARE

## 2025-05-23 VITALS
HEART RATE: 86 BPM | HEIGHT: 65 IN | SYSTOLIC BLOOD PRESSURE: 126 MMHG | RESPIRATION RATE: 18 BRPM | BODY MASS INDEX: 34.79 KG/M2 | OXYGEN SATURATION: 99 % | DIASTOLIC BLOOD PRESSURE: 61 MMHG | WEIGHT: 208.8 LBS

## 2025-05-23 DIAGNOSIS — R76.8 POSITIVE LYME DISEASE SEROLOGY: ICD-10-CM

## 2025-05-23 DIAGNOSIS — L20.81 ATOPIC NEURODERMATITIS: ICD-10-CM

## 2025-05-23 DIAGNOSIS — T78.1XXA ALLERGIC REACTION TO ALPHA-GAL: Primary | ICD-10-CM

## 2025-05-23 DIAGNOSIS — M79.7 FIBROMYALGIA: ICD-10-CM

## 2025-05-23 PROBLEM — K21.9 GASTROESOPHAGEAL REFLUX DISEASE WITHOUT ESOPHAGITIS: Status: RESOLVED | Noted: 2022-05-17 | Resolved: 2025-05-23

## 2025-05-23 PROBLEM — T38.0X5A CORTICOSTEROIDS ADVERSE REACTION: Status: RESOLVED | Noted: 2024-07-21 | Resolved: 2025-05-23

## 2025-05-23 PROBLEM — R10.84 GENERALIZED ABDOMINAL PAIN: Status: RESOLVED | Noted: 2022-05-17 | Resolved: 2025-05-23

## 2025-05-23 PROBLEM — A69.23 LYME ARTHRITIS: Status: RESOLVED | Noted: 2024-11-07 | Resolved: 2025-05-23

## 2025-05-23 PROBLEM — M79.642 PAIN IN BOTH HANDS: Status: RESOLVED | Noted: 2024-07-21 | Resolved: 2025-05-23

## 2025-05-23 PROBLEM — B89 PARASITIC INFECTION: Status: RESOLVED | Noted: 2024-11-07 | Resolved: 2025-05-23

## 2025-05-23 PROBLEM — R53.1 WEAKNESS: Status: RESOLVED | Noted: 2024-07-21 | Resolved: 2025-05-23

## 2025-05-23 PROBLEM — N28.9 RENAL INSUFFICIENCY: Status: RESOLVED | Noted: 2024-07-21 | Resolved: 2025-05-23

## 2025-05-23 PROBLEM — M79.641 PAIN IN BOTH HANDS: Status: RESOLVED | Noted: 2024-07-21 | Resolved: 2025-05-23

## 2025-05-23 PROBLEM — L98.9 SKIN LESION: Status: RESOLVED | Noted: 2018-03-08 | Resolved: 2025-05-23

## 2025-05-23 PROBLEM — E55.9 VITAMIN D DEFICIENCY: Status: RESOLVED | Noted: 2024-07-21 | Resolved: 2025-05-23

## 2025-05-23 PROBLEM — R06.02 SHORTNESS OF BREATH: Status: RESOLVED | Noted: 2022-02-21 | Resolved: 2025-05-23

## 2025-05-23 PROBLEM — M62.81 MUSCULAR WEAKNESS: Status: RESOLVED | Noted: 2022-02-21 | Resolved: 2025-05-23

## 2025-05-23 PROBLEM — G47.33 OBSTRUCTIVE SLEEP APNEA: Status: RESOLVED | Noted: 2022-02-21 | Resolved: 2025-05-23

## 2025-05-23 PROBLEM — E66.01 MORBID OBESITY DUE TO EXCESS CALORIES: Status: RESOLVED | Noted: 2018-03-08 | Resolved: 2025-05-23

## 2025-05-23 PROBLEM — R19.7 DIARRHEA: Status: RESOLVED | Noted: 2022-05-17 | Resolved: 2025-05-23

## 2025-05-23 PROBLEM — Z79.899 HIGH RISK MEDICATION USE: Status: RESOLVED | Noted: 2024-07-21 | Resolved: 2025-05-23

## 2025-05-23 PROCEDURE — 86008 ALLG SPEC IGE RECOMB EA: CPT | Performed by: INTERNAL MEDICINE

## 2025-05-23 PROCEDURE — 86003 ALLG SPEC IGE CRUDE XTRC EA: CPT | Performed by: INTERNAL MEDICINE

## 2025-05-23 PROCEDURE — 82785 ASSAY OF IGE: CPT | Performed by: INTERNAL MEDICINE

## 2025-05-24 LAB — B BURGDOR IGG+IGM SER QL IA: NEGATIVE

## 2025-05-28 LAB
A PHAGOCYTOPH DNA BLD QL NAA+PROBE: NEGATIVE
ALPHA-GAL IGE QN: 8.5 KU/L
BEEF IGE QN: 2.96 KU/L
CONV CLASS DESCRIPTION: ABNORMAL
EHRLICHIA DNA SPEC QL NAA+PROBE: NEGATIVE
IGE SERPL-ACNC: 660 IU/ML (ref 6–495)
LAMB IGE QN: 1.52 KU/L
PORK IGE QN: 0.77 KU/L

## 2025-05-29 LAB — RICKETTSIA RICKETTSII DNA, RT: NOT DETECTED

## 2025-08-25 ENCOUNTER — OFFICE VISIT (OUTPATIENT)
Dept: INFECTIOUS DISEASES | Facility: CLINIC | Age: 75
End: 2025-08-25
Payer: MEDICARE

## 2025-08-25 VITALS
RESPIRATION RATE: 18 BRPM | DIASTOLIC BLOOD PRESSURE: 58 MMHG | HEART RATE: 71 BPM | OXYGEN SATURATION: 96 % | WEIGHT: 216.4 LBS | SYSTOLIC BLOOD PRESSURE: 137 MMHG | HEIGHT: 65 IN | BODY MASS INDEX: 36.06 KG/M2

## 2025-08-25 DIAGNOSIS — M35.00 SJOGREN'S SYNDROME, WITH UNSPECIFIED ORGAN INVOLVEMENT: ICD-10-CM

## 2025-08-25 DIAGNOSIS — R76.8 POSITIVE LYME DISEASE SEROLOGY: ICD-10-CM

## 2025-08-25 DIAGNOSIS — M35.3 POLYMYALGIA RHEUMATICA: ICD-10-CM

## 2025-08-25 DIAGNOSIS — M31.6 TEMPORAL ARTERITIS: ICD-10-CM

## 2025-08-25 DIAGNOSIS — T78.1XXA ALLERGIC REACTION TO ALPHA-GAL: Primary | ICD-10-CM

## 2025-08-25 PROCEDURE — 82785 ASSAY OF IGE: CPT | Performed by: INTERNAL MEDICINE

## 2025-08-25 PROCEDURE — 86003 ALLG SPEC IGE CRUDE XTRC EA: CPT | Performed by: INTERNAL MEDICINE

## 2025-08-25 PROCEDURE — 86008 ALLG SPEC IGE RECOMB EA: CPT | Performed by: INTERNAL MEDICINE

## 2025-08-25 PROCEDURE — 99214 OFFICE O/P EST MOD 30 MIN: CPT | Performed by: INTERNAL MEDICINE

## 2025-08-25 RX ORDER — ONDANSETRON 4 MG/1
4 TABLET, FILM COATED ORAL EVERY 6 HOURS PRN
Status: SHIPPED | OUTPATIENT
Start: 2025-08-25

## 2025-08-29 LAB
ALPHA-GAL IGE QN: 5.2 KU/L
BEEF IGE QN: 1.48 KU/L
IGE SERPL-ACNC: 399 IU/ML (ref 6–495)
LAMB IGE QN: 1.06 KU/L
PORK IGE QN: 0.65 KU/L
SERVICE CMNT-IMP: ABNORMAL

## (undated) DEVICE — ENDOGATOR TUBING FOR ENDOGATOR EGP-100 IRRIGATION PUMP,OLYMPUS OFP PUMP, OLYMPUS AFU-100 PUMP AND ERBE EIP2 PUMP: Brand: ENDOGATOR

## (undated) DEVICE — CONN Y IRR DISP 1P/U

## (undated) DEVICE — THE BITE BLOCK MAXI, LATEX FREE STRAP IS USED TO PROTECT THE ENDOSCOPE INSERTION TUBE FROM BEING BITTEN BY THE PATIENT.

## (undated) DEVICE — TRAP,MUCUS SPECIMEN,40CC: Brand: MEDLINE

## (undated) DEVICE — FRCP BX RADJAW4 NDL 2.8 240CM LG OG BX40

## (undated) DEVICE — Device

## (undated) DEVICE — SNAR POLYP CAPTIFLX MICRO OVL 13MM 240CM

## (undated) DEVICE — Device: Brand: DEFENDO AIR/WATER/SUCTION AND BIOPSY VALVE

## (undated) DEVICE — ENDOGATOR AUXILIARY WATER JET CONNECTOR: Brand: ENDOGATOR